# Patient Record
Sex: MALE | Race: WHITE | Employment: FULL TIME | URBAN - NONMETROPOLITAN AREA
[De-identification: names, ages, dates, MRNs, and addresses within clinical notes are randomized per-mention and may not be internally consistent; named-entity substitution may affect disease eponyms.]

---

## 2018-08-15 ENCOUNTER — HOSPITAL ENCOUNTER (INPATIENT)
Age: 23
LOS: 5 days | Discharge: HOME | DRG: 885 | End: 2018-08-21
Attending: EMERGENCY MEDICINE | Admitting: PSYCHIATRY & NEUROLOGY
Payer: COMMERCIAL

## 2018-08-15 DIAGNOSIS — R45.851 DEPRESSION WITH SUICIDAL IDEATION: Primary | ICD-10-CM

## 2018-08-15 DIAGNOSIS — F32.A DEPRESSION WITH SUICIDAL IDEATION: Primary | ICD-10-CM

## 2018-08-15 LAB
ACETAMINOPHEN LEVEL: <15 UG/ML
ALBUMIN SERPL-MCNC: 4.8 G/DL (ref 3.5–5.2)
ALP BLD-CCNC: 62 U/L (ref 40–130)
ALT SERPL-CCNC: 13 U/L (ref 5–41)
AMPHETAMINE SCREEN, URINE: NEGATIVE
ANION GAP SERPL CALCULATED.3IONS-SCNC: 14 MMOL/L (ref 7–19)
AST SERPL-CCNC: 16 U/L (ref 5–40)
BARBITURATE SCREEN URINE: NEGATIVE
BENZODIAZEPINE SCREEN, URINE: NEGATIVE
BILIRUB SERPL-MCNC: 0.7 MG/DL (ref 0.2–1.2)
BUN BLDV-MCNC: 10 MG/DL (ref 6–20)
CALCIUM SERPL-MCNC: 9.7 MG/DL (ref 8.6–10)
CANNABINOID SCREEN URINE: NEGATIVE
CHLORIDE BLD-SCNC: 102 MMOL/L (ref 98–111)
CO2: 23 MMOL/L (ref 22–29)
COCAINE METABOLITE SCREEN URINE: NEGATIVE
CREAT SERPL-MCNC: 0.9 MG/DL (ref 0.5–1.2)
ETHANOL: <10 MG/DL (ref 0–0.08)
GFR NON-AFRICAN AMERICAN: >60
GLUCOSE BLD-MCNC: 101 MG/DL (ref 74–109)
HCT VFR BLD CALC: 43.2 % (ref 42–52)
HEMOGLOBIN: 14.8 G/DL (ref 14–18)
Lab: NORMAL
MCH RBC QN AUTO: 29.7 PG (ref 27–31)
MCHC RBC AUTO-ENTMCNC: 34.3 G/DL (ref 33–37)
MCV RBC AUTO: 86.7 FL (ref 80–94)
OPIATE SCREEN URINE: NEGATIVE
PDW BLD-RTO: 11.9 % (ref 11.5–14.5)
PLATELET # BLD: 213 K/UL (ref 130–400)
PMV BLD AUTO: 11.5 FL (ref 9.4–12.4)
POTASSIUM SERPL-SCNC: 4.1 MMOL/L (ref 3.5–5)
RBC # BLD: 4.98 M/UL (ref 4.7–6.1)
SALICYLATE, SERUM: <3 MG/DL (ref 3–10)
SODIUM BLD-SCNC: 139 MMOL/L (ref 136–145)
TOTAL PROTEIN: 7.8 G/DL (ref 6.6–8.7)
WBC # BLD: 9.8 K/UL (ref 4.8–10.8)

## 2018-08-15 PROCEDURE — G0480 DRUG TEST DEF 1-7 CLASSES: HCPCS

## 2018-08-15 PROCEDURE — 36415 COLL VENOUS BLD VENIPUNCTURE: CPT

## 2018-08-15 PROCEDURE — 85027 COMPLETE CBC AUTOMATED: CPT

## 2018-08-15 PROCEDURE — 99285 EMERGENCY DEPT VISIT HI MDM: CPT

## 2018-08-15 PROCEDURE — 80307 DRUG TEST PRSMV CHEM ANLYZR: CPT

## 2018-08-15 PROCEDURE — 80053 COMPREHEN METABOLIC PANEL: CPT

## 2018-08-15 ASSESSMENT — PATIENT HEALTH QUESTIONNAIRE - PHQ9: SUM OF ALL RESPONSES TO PHQ QUESTIONS 1-9: 9

## 2018-08-16 PROBLEM — R45.851 SUICIDAL IDEATION: Status: ACTIVE | Noted: 2018-08-16

## 2018-08-16 PROCEDURE — 99285 EMERGENCY DEPT VISIT HI MDM: CPT | Performed by: EMERGENCY MEDICINE

## 2018-08-16 PROCEDURE — 99222 1ST HOSP IP/OBS MODERATE 55: CPT | Performed by: PSYCHIATRY & NEUROLOGY

## 2018-08-16 PROCEDURE — 6370000000 HC RX 637 (ALT 250 FOR IP): Performed by: PSYCHIATRY & NEUROLOGY

## 2018-08-16 PROCEDURE — 1240000000 HC EMOTIONAL WELLNESS R&B

## 2018-08-16 PROCEDURE — 6370000000 HC RX 637 (ALT 250 FOR IP)

## 2018-08-16 RX ORDER — ACETAMINOPHEN 325 MG/1
650 TABLET ORAL EVERY 4 HOURS PRN
Status: DISCONTINUED | OUTPATIENT
Start: 2018-08-16 | End: 2018-08-21 | Stop reason: HOSPADM

## 2018-08-16 RX ORDER — FLUOXETINE HYDROCHLORIDE 20 MG/1
20 CAPSULE ORAL DAILY
Status: DISCONTINUED | OUTPATIENT
Start: 2018-08-16 | End: 2018-08-17

## 2018-08-16 RX ORDER — ACETAMINOPHEN 325 MG/1
TABLET ORAL
Status: COMPLETED
Start: 2018-08-16 | End: 2018-08-16

## 2018-08-16 RX ADMIN — ACETAMINOPHEN 650 MG: 325 TABLET ORAL at 14:09

## 2018-08-16 RX ADMIN — FLUOXETINE 20 MG: 20 CAPSULE ORAL at 14:09

## 2018-08-16 ASSESSMENT — SLEEP AND FATIGUE QUESTIONNAIRES
RESTFUL SLEEP: NO
AVERAGE NUMBER OF SLEEP HOURS: 6
DIFFICULTY FALLING ASLEEP: YES
DIFFICULTY STAYING ASLEEP: YES
DIFFICULTY ARISING: NO
DO YOU HAVE DIFFICULTY SLEEPING: YES
SLEEP PATTERN: DIFFICULTY FALLING ASLEEP;RESTLESSNESS

## 2018-08-16 ASSESSMENT — LIFESTYLE VARIABLES: HISTORY_ALCOHOL_USE: NO

## 2018-08-16 ASSESSMENT — PATIENT HEALTH QUESTIONNAIRE - PHQ9: SUM OF ALL RESPONSES TO PHQ QUESTIONS 1-9: 9

## 2018-08-16 ASSESSMENT — PAIN SCALES - GENERAL: PAINLEVEL_OUTOF10: 6

## 2018-08-16 NOTE — PROGRESS NOTES
B[facility] Admission From ED  Nursing Admission Note              There is no problem list on file for this patient. Pt admitted from Dr. Kyle Began care in ED to Adult [facility] room # 608 bed 2. Arrived on unit via [facility] with tech and . Pt appropriately attired in [place]. Body assessment completed by Danitza Boyer with no contraband discovered. All tubes, lines, and drains were appropriately discontinued by ED staff prior to pt transfer to [facility]. Pt belongings and valuables inventoried and cataloged, stored per policy. Pt oriented to surroundings, program expectations, and copy pt rights given. Received admit packet: [facility], Visitation Info, Fall Prevention, Restraints Info. Consents reviewed, signed Pt Rights, Handbook Acceptance, Visit/Call Acceptance, PHI Release, Social Info Release, and Treatment Agreement. Pt verbalizes understanding. Identifies stressors.           Best Noriega RN

## 2018-08-16 NOTE — BH NOTE
Psychiatry Initial Intake    8/15/18    Fernando Angeles ,a 25 y.o. male, presents to the ED for a psychiatric assessment. ED Arrival time: 2200  ED physician: [facility] Notification time: 2313  [facility] AN AFFILIATE OF [facility] Assess time: [phone]  Psychiatrist call time: Brayan Stallings with Dr. Adrianne Quigley    Patient is referred by: self. Patient's brother drove him to the ER. Reason for visit to ED - Presenting problem:     Patient states, \"I haven't felt like myself for about 48 hours ago. I found out something about my wife yesterday that just made my heart drop. It made me feel just terrible. I felt shocked and hurt. I threw the tablet with the text on it down the salguero and it broke. Then I went to my bedside table where I keep my pistol. I just felt like that I wanted to die. I was getting ready to cock it and put it to my head when my wife stopped me. After that, I heard my 2 week old daughter. My wife has post partum pretty bad but it has really been affecting all of us. She doesn't want anything to do with me. When I first saw the message, my emotions were hurt and at the same time I was pissed and I really wanted to hurt the person that sent the message. \"  Patient denies AVH at this time;. Patient's niece, Lusi Marcelo, is at bedside with patient's permission. ER Physician reports:  Fernando Angeles is a 25 y.o. male who presents to the emergency department Due to depression and suicidal ideation. Patient said that his wife recently had a baby. He said that she's been dealing with postpartum depression and this is causing increased stress for him. He said he has always had some anxiety but has never seen anyone about this. He will not provide all the details but from what I gather it sounds like he had some suspicion that his wife was cheating on him. He said when he found this out today (after reading some of her text messages) he became very upset this morning.  He said that he went to get his pistol with plan to shoot himself but his wife stopped him. I questioned patient about HI. He denies this but said when he was very upset this morning he may have had HI. Very vague about this. He will not give me more details as far as what he means. Denies any HI at this time. No hallucinations. No delusions. No other medical problems.       Duration of symptoms: Worsening over the last 3 weeks. Current Stressors: family    SI:  admits to   Plan: yes   If yes describe: see above  Past SI attempts: no   If yes describe:    How many: 0  Dates or Ages:   Currently able to contract for safety outside hospital: no   Describe: patient is SI    C-SSRS Completed: yes    HI: admits to  If yes describe:  Wanted to hurt the person that sent the message  Delusions: denies  If yes describe:   Hallucinations: denies   If yes describe:   Risk of Harm to self: yes   If yes explain: see above  Was it within the past 6 months: yes   Risk of Harm to others: yes   If yes explain: see above  Was it within the past 6 months: yes   Anxiety 1-10:  10  Explain if increased:   Depression 1-10:  4  Explain if increased:   Risk taking behaviors: no  If yes explain:  Level of function outside hospital decreased: no   If yes explain:       History of Psychiatric Treatment:     Previous Outpatient therapy: No  If yes where & when:   Are you compliant with appointments: Yes  Psychiatric Hospitalizations: No   Where & When:   Previous Diagnosis:  no  Previous psychiatric medications: No   If yes list examples:   Are you compliant with medications: Yes     Violence and Trauma History:     History of violence by patient: no   If yes explain:   History of Trauma: no    If yes explain:   History of Abuse: no   If yes explain/by whom:     Family History:    Family history of mental illness: yes   Family member & Diagnosis:  yes, mother, brother, niece, and nephew with Depression, Anxiety and Bipolar  Family members with suicide attempt: no   If yes explain:   Family

## 2018-08-16 NOTE — PLAN OF CARE
Problem: Depressive Behavior With or Without Suicide Precautions:  Goal: Able to verbalize acceptance of life and situations over which he or she has no control  Able to verbalize acceptance of life and situations over which he or she has no control  Outcome: Ongoing                                                                      Group Therapy Note    Date: 8/16/2018  Start Time: 1015  End Time:  1100  Number of Participants: 13    Type of Group: Psychoeducation    Wellness Binder Information  Module Name:  Emotional wellness  Session Number:  2    Patient's Goal:  Anger management    Notes:  Pt attended group as scheduled. Pt participated by identifying situations in life that causes anger. Pt participated in group discussion exploring anger management techniques such as taking a time out, exercising, and deep breathing. Status After Intervention:  Improved    Participation Level:  Active Listener    Participation Quality: Attentive      Speech:  normal      Thought Process/Content: Logical      Affective Functioning: Congruent      Mood: depressed      Level of consciousness:  Attentive      Response to Learning: Able to verbalize current knowledge/experience      Endings: None Reported    Modes of Intervention: Education and Support      Discipline Responsible: /Counselor      Signature:  Cheryl Armando

## 2018-08-16 NOTE — PLAN OF CARE
Problem: Depressive Behavior With or Without Suicide Precautions:  Goal: Able to verbalize acceptance of life and situations over which he or she has no control  Able to verbalize acceptance of life and situations over which he or she has no control   Outcome: Ongoing                                                                      Group Therapy Note    Date: 8/16/2018  Start Time: 1430  End Time:  1530  Number of Participants: 8    Type of Group: Recovery    Wellness Binder Information  Module Name:  Stress  Session Number:  5    Patient's Goal:  To raise awareness of the effectiveness of diversionary coping skills    Notes:  Pt demonstrated improved awareness of the effectiveness of diversionary coping skills by actively participating in group activity.     Status After Intervention:  Unchanged    Participation Level: Interactive    Participation Quality: Attentive      Speech:  normal      Thought Process/Content: Logical      Affective Functioning: Congruent      Mood: anxious and depressed      Level of consciousness:  Alert and Oriented x4      Response to Learning: Able to verbalize current knowledge/experience, Able to verbalize/acknowledge new learning and Progressing to goal      Endings: None Reported    Modes of Intervention: Education      Discipline Responsible: Psychoeducational Specialist      Signature:  Santos Ramos

## 2018-08-16 NOTE — PROGRESS NOTES
BHI Daily Shift Assessment  Nursing Progress Note    Room: 06/611-01 Name: Kristal Stovall Age: 25 y.o. Ethnicity:  Gender: male   Dx: <principal problem not specified>  Precautions: suicide risk  CPAP: No Accu-Chek: No  Sleep: Yes, Good, no sleep issues Hours Slept: 8 Sched Sleep Meds: No PRN Sleep Meds: No Other PRN Meds: Yes Med Compliant: Yes Appetite: good Percent Meals: 100% Social: No ADLs: No Speech: normal Depression: 3 Anxiety: 8  Patient is calm, pleasant, and cooperative. Thought processes are linear, logical, and goal oriented. Appearance is appropriate. Affect is flat and congruent. Mood is sad. Poor eye contact made. Not sociable. Patient is evasive and withdrawn. Attending groups.     Rosie Bernla RN

## 2018-08-16 NOTE — PLAN OF CARE
Problem: Depressive Behavior With or Without Suicide Precautions:  Goal: Able to verbalize acceptance of life and situations over which he or she has no control  Able to verbalize acceptance of life and situations over which he or she has no control   Outcome: Ongoing                                                                      Group Therapy Note    Date: 8/16/2018  Start Time: 1100  End Time:  1200  Number of Participants: 9    Type of Group: Cognitive Skills    Wellness Binder Information  Module Name:  Mental Health Wellness  Session Number:  1    Patient's Goal:  To improve knowledge of practical facts about depression    Notes:  Pt demonstrated improved knowledge of practical facts about depression by actively participating in group discussion.     Status After Intervention:  Unchanged    Participation Level: Interactive    Participation Quality: Attentive      Speech:  normal      Thought Process/Content: Logical      Affective Functioning: Congruent      Mood: anxious and depressed      Level of consciousness:  Alert and Oriented x4      Response to Learning: Able to verbalize current knowledge/experience, Able to verbalize/acknowledge new learning and Progressing to goal      Endings: None Reported    Modes of Intervention: Education      Discipline Responsible: Psychoeducational Specialist      Signature:  Mary Jackson

## 2018-08-16 NOTE — PLAN OF CARE
Problem: Health Behavior:  Goal: Ability to verbalize adaptive coping strategies to use when suicidal feelings occur will improve  Ability to verbalize adaptive coping strategies to use when suicidal feelings occur will improve   Outcome: Ongoing    Goal: Ability to verbalize adaptive coping strategies to use when the urge to self-mutilate occurs will improve  Ability to verbalize adaptive coping strategies to use when the urge to self-mutilate occurs will improve   Outcome: Ongoing

## 2018-08-16 NOTE — BH NOTE
[facility] CHRISTOPHER [address], [address] [facility]                              PSYCHIATRIC EVALUATION    PATIENT NAME: Jadyn Zaragoza                    :        1995  MED REC NO:   [id number]                              ROOM:       [facility]  ACCOUNT NO:   [de-identified]                           ADMIT DATE: 08/15/2018  PROVIDER:     Roosevelt Lucas MD    IDENTIFYING INFORMATION:  The patient is a 80-year-old    male who lives with his wife, his 1week-old daughter, in [place], [place]. The patient has a job at [employer]. The patient was admitted  from ER to inpatient psych unit for depression, suicidal ideation on  08/15/2018 voluntarily. This MD interviewed, examined the patient in  person face-to-face for psychiatric evaluation on 2018. CHIEF COMPLAINT:  \"I need help for my depression, anxiety, and suicidal  ideation. \"    HISTORY OF PRESENT ILLNESS:  The patient is a 80-year-old    male, who denies significant past medical history, but significant past  psychiatric family history of depression, ADHD and past psychiatric history  of seasonal affective disorder, was brought to the emergency room by his  brother, his sister-in-law, his niece for his depression, suicidal  ideation. Per the patient during the interview today and he has a long  history of depression at least more than 6 years ago. When the winter time, he  feels more depressed. He had never asked for any help. His depression got  worse in the past several weeks after his wife gave birth to a daughter. Per the patient, his wife has postpartum depression and has been giving him  a hard time and yesterday he found out that his wife was texting to other  man, he got so mad, so sad, he went to his bedroom and got the pistol and  tried to commit suicide, but his wife stopped him.   Per the patient in the  past several weeks, he feels sad most of the time with alert and oriented to  time, place and person. His short-term memory seems to be mildly impaired,  long-term memory seems to be fair. Language, articulated. Speech, slow  rate and rhythm and volume, but articulated, coherent. Attention span is  very short. Concentration is poor. Mood, the patient reports as sad and  anxious. Affect is mood congruent. Thought process is organized and  logical.  Association is intact. Thought content, the patient admits to  suicidal ideation with a plan by gun shooting, but denied any homicidal  ideation. Denied any auditory or visual hallucinations or any paranoia. The patient seemed to have average IQ and education imparted knowledge. The patient had limited insight regarding his current psychiatric condition  and poor judgment. The patient had evidence of suicidal ideation with a  plan. ASSESSMENT:  Major depressive disorder, single episode, moderate to severe  without psychotic features with seasonal pattern, persistent depressive disorder. PLAN:  The patient is an imminent danger to himself, therefore inpatient  level of care is needed. Keep the patient under 15-minute safe check for  safety, psychiatric education regarding major depressive disorder,  persistent depressive disorder, and treatment options. The patient will  also be provided with individual therapy, group therapy, substance abuse  counseling, gains-added coping skills. Dr. Hans Marcial will examine the patient  for any acute medical issues. Start the patient with Prozac 20 mg daily  for his depression and anxiety and titrate the dosage slowly. The patient  has been strongly encouraged to go to group and coping skill for stress,  anxiety. The patient will be assessed on a daily basis for his depression,  anxiety, and suicidal ideation on a daily basis and will be discharged back  to his outpatient mental health provider upon stabilization. This is a  voluntary admission.   Expected length of

## 2018-08-17 LAB
TSH SERPL DL<=0.05 MIU/L-ACNC: 1.82 UIU/ML (ref 0.27–4.2)
VITAMIN B-12: 283 PG/ML (ref 211–946)
VITAMIN D 25-HYDROXY: 16.6 NG/ML

## 2018-08-17 PROCEDURE — 1240000000 HC EMOTIONAL WELLNESS R&B

## 2018-08-17 PROCEDURE — 99232 SBSQ HOSP IP/OBS MODERATE 35: CPT | Performed by: PSYCHIATRY & NEUROLOGY

## 2018-08-17 PROCEDURE — 36415 COLL VENOUS BLD VENIPUNCTURE: CPT

## 2018-08-17 PROCEDURE — 82306 VITAMIN D 25 HYDROXY: CPT

## 2018-08-17 PROCEDURE — 84443 ASSAY THYROID STIM HORMONE: CPT

## 2018-08-17 PROCEDURE — 82607 VITAMIN B-12: CPT

## 2018-08-17 PROCEDURE — 6370000000 HC RX 637 (ALT 250 FOR IP): Performed by: PSYCHIATRY & NEUROLOGY

## 2018-08-17 RX ORDER — TRAZODONE HYDROCHLORIDE 50 MG/1
50 TABLET ORAL NIGHTLY
Status: DISCONTINUED | OUTPATIENT
Start: 2018-08-17 | End: 2018-08-21 | Stop reason: HOSPADM

## 2018-08-17 RX ORDER — CHOLECALCIFEROL (VITAMIN D3) 125 MCG
500 CAPSULE ORAL DAILY
Status: DISCONTINUED | OUTPATIENT
Start: 2018-08-18 | End: 2018-08-21 | Stop reason: HOSPADM

## 2018-08-17 RX ORDER — ERGOCALCIFEROL (VITAMIN D2) 1250 MCG
50000 CAPSULE ORAL WEEKLY
Qty: 11 CAPSULE | Refills: 0 | Status: SHIPPED | OUTPATIENT
Start: 2018-08-18 | End: 2021-07-28

## 2018-08-17 RX ORDER — ERGOCALCIFEROL 1.25 MG/1
50000 CAPSULE ORAL WEEKLY
Status: DISCONTINUED | OUTPATIENT
Start: 2018-08-18 | End: 2018-08-21 | Stop reason: HOSPADM

## 2018-08-17 RX ADMIN — FLUOXETINE 20 MG: 20 CAPSULE ORAL at 08:18

## 2018-08-17 RX ADMIN — TRAZODONE HYDROCHLORIDE 50 MG: 50 TABLET ORAL at 22:06

## 2018-08-17 NOTE — PROGRESS NOTES
Group Therapy Note    Date: 8/16/2018  Start Time: 2015  End Time:  2045  Number of Participants: 12    Type of Group: Wrap-Up    Wellness Binder Information  Module Name:    Session Number:      Patient's Goal:      Notes:  Filled out wrap up sheet    Status After Intervention:      Participation Level:     Participation Quality:       Speech:         Thought Process/Content:       Affective Functioning:       Mood:       Level of consciousness:        Response to Learning:       Endings:     Modes of Intervention:       Discipline Responsible: [address] [place] [address]      Signature:  Linda Smith

## 2018-08-17 NOTE — H&P
HISTORY and PHYSICAL      CHIEF COMPLAINT:  Depression, SI    Reason for Admission:  Depression, SI    History Obtained From:  patient    HISTORY OF PRESENT ILLNESS:      The patient is a 25 y.o. male who is admitted to the [place] [address] unit with worsening mood issues. He has no c/o CP or SOA. No abdominal pain or N/V. No dyusria. He has c/o HA. This is a typical HA for him. No recent illnesses or fevers. No pain complaints. Past Medical History:    History reviewed. No pertinent past medical history. Past Surgical History:    History reviewed. No pertinent surgical history. Medications Prior to Admission:    No prescriptions prior to admission. Allergies:  Azithromycin    Social History:   TOBACCO:   reports that he has never smoked. His smokeless tobacco use includes Snuff. ETOH:   reports that he does not drink alcohol. DRUGS:   reports that he does not use drugs. MARITAL STATUS:   OCCUPATION:  Working and in school  Patient currently lives with family       Family History:   History reviewed. No pertinent family history. REVIEW OF SYSTEMS:  Constitutional: neg  CV: neg  Pulmonary: neg  GI: neg  : neg  Psych: depression, SI  Neuro: HA  Skin: neg  MusculoSkeletal: neg  HEENT: neg  Joints: neg    Vitals:  /63   Pulse 77   Temp 96.5 °F (35.8 °C) (Temporal)   Resp 18   Wt 175 lb 6.4 oz (79.6 kg)   SpO2 97%     PHYSICAL EXAM:  Gen: NAD, alert  HEENT: WNL  Lymph: no LAD  Neck: no JVD or masses  Chest: CTA bilat  CV: RRR  Abdomen: NT/ND  Extrem: no C/C/E  Neuro: non focal  Skin: no rashes  Joints: no redness    DATA:  I have reviewed the admission labs and imaging tests.     ASSESSMENT AND PLAN:      Patient Active Hospital Problem List:   Depression, Suicidal ideation----follow with Saad Viera MD  10:49 PM 8/16/2018

## 2018-08-17 NOTE — PLAN OF CARE
Problem: Altered Mood, Depressive Behavior:  Intervention: Assess coping skills and behavior                                                                      Group Therapy Note    Date: 8/17/2018  Start Time: 1000  End Time:  7105  Number of Participants: 12    Type of Group: Psychotherapy    Wellness Binder Information  Module Name:  Staying well  Session Number:  1    Patient's Goal:  Daily maintenance and coping skills    Notes:  Pt acknowledged use of positive coping skills daily to help stay well.     Status After Intervention:  Improved    Participation Level: Interactive    Participation Quality: Appropriate, Attentive and Sharing      Speech:  normal      Thought Process/Content: Logical      Affective Functioning: Congruent      Mood: congruent      Level of consciousness:  Alert, Oriented x4 and Attentive      Response to Learning: Able to verbalize current knowledge/experience      Endings: None Reported    Modes of Intervention: Education      Discipline Responsible: Psychoeducational Specialist      Signature:  Jessica Vogt

## 2018-08-17 NOTE — PROGRESS NOTES
Patient is calm and cooperative with care. Patient is social and attends groups. Patient is compliant with medications. Patient completed ADLs. Patient's appetite is good and he reported he slept well. Patient rates D: 2-3, A:5, and denies SI, HI, and AVH.

## 2018-08-17 NOTE — PROGRESS NOTES
impaired,  long-term memory seems to be fair. Language, articulated. Speech, slow  rate and rhythm and volume, but articulated, coherent. Attention span is  very short. Concentration is poor. Mood, the patient reports as sad and  anxious. Affect is mood congruent. Thought process is organized and  logical.  Association is intact. Thought content, the patient denies any  suicidal ideation and he also denied any homicidal  ideation. Denied any auditory or visual hallucinations or any paranoia. The patient seemed to have average IQ and education imparted knowledge. The patient had limited insight regarding his current psychiatric condition  and poor judgment. The patient had evidence of suicidal ideation with a  plan.  FLUoxetine  20 mg Oral Daily       Current Medications:  Current Facility-Administered Medications   Medication Dose Route Frequency Provider Last Rate Last Dose    FLUoxetine (PROZAC) capsule 20 mg  20 mg Oral Daily Leonel Tripathi MD   20 mg at 08/17/18 0818    acetaminophen (TYLENOL) tablet 650 mg  650 mg Oral Q4H PRN Leonel Tripathi MD   650 mg at 08/16/18 1409       Psychotherapy:   SUPPORTIVE    DSM V Diagnoses:       Major depressive disorder, single episode, moderate to severe  without psychotic features with seasonal pattern, persistent depressive disorder.     PLAN:  The patient was an imminent danger to himself, therefore inpatient  level of care is needed. Keep the patient under 15-minute safe check for  safety, psychiatric education regarding major depressive disorder,  persistent depressive disorder, and treatment options. The patient will  also be provided with individual therapy, group therapy, substance abuse  counseling, gains-added coping skills. Dr. Jona Orozco will examine the patient  for any acute medical issues. Increase the patient with Prozac 30 mg daily  for his depression and anxiety and titrate the dosage slowly. Start him with   Trazodone 50 mg QHS PRN for insomnia.  The patient  has been strongly encouraged to go to group and coping skill for stress,  anxiety. The patient will be assessed on a daily basis for his depression,  anxiety, and suicidal ideation on a daily basis and will be discharged back  to his outpatient mental health provider upon stabilization. This is a  voluntary admission. Expected length of stay is 3-5 days.       ACTIVE MEDICAL PROBLEMS:  Patient Active Problem List   Diagnosis    Suicidal ideation    Depression with suicidal ideation       Amount of time spent with patient:  15 minutes with greater than 50% of the time spent in counseling and collaboration of care.

## 2018-08-18 PROCEDURE — 6370000000 HC RX 637 (ALT 250 FOR IP): Performed by: PSYCHIATRY & NEUROLOGY

## 2018-08-18 PROCEDURE — 1240000000 HC EMOTIONAL WELLNESS R&B

## 2018-08-18 PROCEDURE — 99232 SBSQ HOSP IP/OBS MODERATE 35: CPT | Performed by: PSYCHIATRY & NEUROLOGY

## 2018-08-18 PROCEDURE — 6370000000 HC RX 637 (ALT 250 FOR IP): Performed by: FAMILY MEDICINE

## 2018-08-18 RX ADMIN — ERGOCALCIFEROL 50000 UNITS: 1.25 CAPSULE ORAL at 09:45

## 2018-08-18 RX ADMIN — FLUOXETINE 30 MG: 20 CAPSULE ORAL at 09:45

## 2018-08-18 RX ADMIN — CYANOCOBALAMIN TAB 500 MCG 500 MCG: 500 TAB at 09:45

## 2018-08-18 NOTE — PROGRESS NOTES
Group Therapy Note    Date: 8/17/2018  Start Time: 1915  End Time:  2015  Number of Participants: 16    Type of Group: Wrap-Up    Patient's Goal:  Pt was in attendance. Notes:        Participation Level:  Active Listener    Participation Quality: Attentive      Speech:  normal      Thought Process/Content: Logical      Affective Functioning: Congruent      Mood: calm      Level of consciousness:  Alert, Oriented x4 and Attentive      Discipline Responsible: Behavorial Gogiro      Signature: Selvin Washington

## 2018-08-18 NOTE — PROGRESS NOTES
He said that his wife stopped him. He said that his wife have post partum depression and felt that she was going to leave him. Pt said that he is happy to be in the unit, he said that he did not want to loose his family. PLAN:    1. Continue precautions, protocols as ordered. 2. Initiate, continue, adjust medications as ordered. 3. Individual / group therapies, including coping skill development and hope building strategies  4. Ongoing discharge and aftercare planning. 5. Continue with current treatment at this time. 6.   Pt was educated about cessation of drugs, etoh, smoking. Continue with current treatment  Pt is interested in couple therapy.

## 2018-08-18 NOTE — PROGRESS NOTES
NURSING PROGRESS NOTE:     DATA: Patient interview      ACTION: Continuous 15 minute monitoring of patient; interview and observation of patient; medications given as ordered. Suicide Precautions in place.     RESPONSE: patient is alert,oriented, calm and cooperative. patient is in day room on the phone with his wife. patient did come to the desk and show staff photos that his wife had brought to him at his visit today. Patient did state that his anxiety has been up and down today and he doesn't know why it has  Been like that. Patient did attend group and is med compliant. No obvious s/s of distress voiced or noted.  Will continue to monitor.          Depression: 2  Anxiety:6  [address] [facility]

## 2018-08-18 NOTE — PROGRESS NOTES
Progress Note  Booker Hayward  8/17/2018 10:28 PM  Subjective:   Admit Date:   8/15/2018      CC/ADMIT DX:       Interval History:   Reviewed overnight events and nursing notes. No new physical complaints. I have reviewed all labs/diagnostics from the last 24hrs. ROS:   I have done a 10 point ROS and all are negative, except what is mentioned in the HPI. DIET GENERAL;    Medications:      [START ON 8/18/2018] FLUoxetine  30 mg Oral Daily    traZODone  50 mg Oral Nightly    [START ON 8/18/2018] vitamin D  50,000 Units Oral Weekly    [START ON 8/18/2018] vitamin B-12  500 mcg Oral Daily           Objective:   Vitals: /85   Pulse 61   Temp 96.7 °F (35.9 °C) (Temporal)   Resp 16   Wt 175 lb 6.4 oz (79.6 kg)   SpO2 97%  No intake or output data in the 24 hours ending 08/17/18 2228  General appearance: alert and cooperative with exam  Lungs: clear to auscultation bilaterally  Heart: regular rate and rhythm, S1, S2 normal, no murmur, click, rub or gallop  Abdomen: soft, non-tender; bowel sounds normal; no masses,  no organomegaly  Extremities: extremities normal, atraumatic, no cyanosis or edema  Neurologic:  No obvious focal neurologic deficits. Assessment and Plan: Active Problems:    Suicidal ideation    Depression with suicidal ideation  Resolved Problems:    * No resolved hospital problems. *    Vit D Def    Plan:  1. Continue present medication(s)   2. Replace Vit D and B12  3. Follow with Psych      Discharge planning:   home     Reviewed treatment plans with the patient and/or family.              Electronically signed by Naseem Villa MD on 8/17/2018 at 10:28 PM

## 2018-08-18 NOTE — PLAN OF CARE
Problem: Depressive Behavior With or Without Suicide Precautions:  Goal: Able to verbalize and/or display a decrease in depressive symptoms  Able to verbalize and/or display a decrease in depressive symptoms   Outcome: Ongoing                                                                      Group Therapy Note    Date: 8/18/2018  Start Time: 1430  End Time:  1500  Number of Participants: 14    Type of Group: Recovery    Wellness Binder Information  Module Name:  Social Wellness  Session Number:  4. Patient's Goal:  Healthy vs. Unhealthy Relationships    Notes: Pt acknowledged that putting someone down and being very judgemental are signs of an unhealthy relationship, and that if there is someone in your life that is unhealthy, you probably need to set up some boundaries in that relationship and focus on putting your well-being above all else. Status After Intervention:  Improved    Participation Level:  Active Listener and Interactive    Participation Quality: Appropriate and Attentive      Speech:  normal      Thought Process/Content: Logical      Affective Functioning: Congruent      Mood: depressed      Level of consciousness:  Alert, Oriented x4 and Attentive      Response to Learning: Able to verbalize current knowledge/experience and Progressing to goal      Endings: None Reported    Modes of Intervention: Support and Clarifying      Discipline Responsible: Psychoeducational Specialist      Signature:  Sarahy Bauer

## 2018-08-18 NOTE — PROGRESS NOTES
Patient is calm and cooperative with care. Patient is minimally social, but does attend groups. Patient is compliant with medications. Patient completed ADLs. Patient's appetite is good and he reported he slept well. Patient rates D:1-2, A:4, and denies SI, HI, and AVH.

## 2018-08-18 NOTE — PLAN OF CARE
Problem: Depressive Behavior With or Without Suicide Precautions:  Goal: Able to verbalize acceptance of life and situations over which he or she has no control  Able to verbalize acceptance of life and situations over which he or she has no control   Outcome: Ongoing                                                                      Group Therapy Note    Date: 8/18/2018  Start Time: 1400  End Time:  1430  Number of Participants: 14    Type of Group: Cognitive Skills    Wellness Binder Information  Module Name:  Staying Well  Session Number:  1. Patient's Goal:  Daily Maintenance and Coping Skills    Notes: Pt created a change-plan worksheet and shared with the group some changes they need to make to improve their mental well-being, important reasons for those changes, steps they plan to take in order to implement these changes, and how their support group can help them through it. Pt learned some important habits they need to form that can change their lives, which included developing positive thinking, displaying kindness daily, developing a daily routine, and focusing on only one goal at a time. Status After Intervention:  Improved    Participation Level:  Active Listener and Interactive    Participation Quality: Appropriate and Attentive      Speech:  normal      Thought Process/Content: Logical      Affective Functioning: Congruent      Mood: depressed      Level of consciousness:  Alert, Oriented x4 and Attentive      Response to Learning: Able to verbalize/acknowledge new learning and Progressing to goal      Endings: None Reported    Modes of Intervention: Support and Clarifying      Discipline Responsible: Psychoeducational Specialist      Signature:  Asher Lim

## 2018-08-19 PROCEDURE — 6370000000 HC RX 637 (ALT 250 FOR IP): Performed by: PSYCHIATRY & NEUROLOGY

## 2018-08-19 PROCEDURE — 1240000000 HC EMOTIONAL WELLNESS R&B

## 2018-08-19 PROCEDURE — 6370000000 HC RX 637 (ALT 250 FOR IP): Performed by: FAMILY MEDICINE

## 2018-08-19 RX ADMIN — FLUOXETINE 30 MG: 20 CAPSULE ORAL at 08:02

## 2018-08-19 RX ADMIN — TRAZODONE HYDROCHLORIDE 50 MG: 50 TABLET ORAL at 21:29

## 2018-08-19 RX ADMIN — CYANOCOBALAMIN TAB 500 MCG 500 MCG: 500 TAB at 08:02

## 2018-08-19 NOTE — PLAN OF CARE
Problem: Depressive Behavior With or Without Suicide Precautions:  Goal: Able to verbalize and/or display a decrease in depressive symptoms  Able to verbalize and/or display a decrease in depressive symptoms   Outcome: Ongoing                                                                      Group Therapy Note    Date: 8/19/2018  Start Time: 1400  End Time:  5934  Number of Participants: 13    Type of Group: Recovery    Wellness Binder Information  Module Name:  Emotional Wellness  Session Number:  3. Patient's Goal:  Happiness    Notes: Pt discussed positive thinking and shared accomplishments they've made, lessons learned, challenges overcome, and things they are grateful for. Status After Intervention:  Improved    Participation Level:  Active Listener    Participation Quality: Appropriate and Attentive      Speech:  normal      Thought Process/Content: Logical      Affective Functioning: Congruent      Mood: depressed      Level of consciousness:  Alert and Oriented x4      Response to Learning: Progressing to goal      Endings: None Reported    Modes of Intervention: Support      Discipline Responsible: Psychoeducational Specialist      Signature:  Tanja Cleaning

## 2018-08-19 NOTE — PROGRESS NOTES
Group Therapy Note    Start Time: 918   End Time:  882  Number of Participants: 16    Type of Group: Community Meeting       Patient's Goal:  \"journaling, Hoping I go home to my family soon\"      Notes:      Participation Level:  Active Listener       Participation Quality: Appropriate      Thought Process/Content: Logical      Affective Functioning: Congruent      Mood: calm      Level of consciousness:  Alert      Modes of Intervention: Support      Discipline Responsible: Behavioral Health Tech II      Signature:  Ca Troncoso

## 2018-08-20 PROCEDURE — 1240000000 HC EMOTIONAL WELLNESS R&B

## 2018-08-20 PROCEDURE — 6370000000 HC RX 637 (ALT 250 FOR IP): Performed by: FAMILY MEDICINE

## 2018-08-20 PROCEDURE — 6370000000 HC RX 637 (ALT 250 FOR IP): Performed by: PSYCHIATRY & NEUROLOGY

## 2018-08-20 PROCEDURE — 99232 SBSQ HOSP IP/OBS MODERATE 35: CPT | Performed by: PSYCHIATRY & NEUROLOGY

## 2018-08-20 RX ADMIN — CYANOCOBALAMIN TAB 500 MCG 500 MCG: 500 TAB at 09:07

## 2018-08-20 RX ADMIN — TRAZODONE HYDROCHLORIDE 50 MG: 50 TABLET ORAL at 21:23

## 2018-08-20 RX ADMIN — FLUOXETINE 30 MG: 20 CAPSULE ORAL at 09:07

## 2018-08-20 NOTE — PLAN OF CARE
Problem: Health Behavior:  Goal: Ability to verbalize adaptive coping strategies to use when suicidal feelings occur will improve  Ability to verbalize adaptive coping strategies to use when suicidal feelings occur will improve   Outcome: Ongoing    Goal: Ability to verbalize adaptive coping strategies to use when the urge to self-mutilate occurs will improve  Ability to verbalize adaptive coping strategies to use when the urge to self-mutilate occurs will improve   Outcome: Ongoing      Problem: Safety:  Goal: Ability to contract for his/her safety will improve  Ability to contract for his/her safety will improve   Outcome: Ongoing      Problem: Depressive Behavior With or Without Suicide Precautions:  Goal: Able to verbalize acceptance of life and situations over which he or she has no control  Able to verbalize acceptance of life and situations over which he or she has no control   Outcome: Ongoing    Goal: Able to verbalize and/or display a decrease in depressive symptoms  Able to verbalize and/or display a decrease in depressive symptoms   Outcome: Ongoing    Goal: Ability to disclose and discuss suicidal ideas will improve  Ability to disclose and discuss suicidal ideas will improve   Outcome: Ongoing    Goal: Able to verbalize support systems  Able to verbalize support systems   Outcome: Ongoing    Goal: Absence of self-harm  Absence of self-harm   Outcome: Ongoing    Goal: Participates in care planning  Participates in care planning   Outcome: Ongoing

## 2018-08-20 NOTE — PROGRESS NOTES
ADMIT DATE: 8/15/2018  Day 4     PROGRESS NOTE    SUBJECTIVE / INTERVAL HX    CHIEF COMPLAINT: \"My wife acts like she hates me. \"    Lisa Han  Is accepted in transfer. He indicates that he came into the hospital feeling suicidal. He caught his wife texting another man that she loved him, so he threw her tablet and destroyed it, then held a gun to his head. He accepts responsibility for ignoring his wife the entire time they have been together. He said he even played XBox while sh was in labor. (He plays XBox every day after work and ignores her.) He regrets this and wants to work out things with her. She is 3 weeks postpartum and according to him suffers from postpartum depression. This is manifested primarily by not wanting to be around him or other people, and she is staying with her father currently. Today he is requesting discharge, although he remains quite vulnerable and passively suicidal. He dose not want to address the fact that his wife may not want to be with him any longer. He says when he is discharged his brother is going to stay with him for his safety. I have suggested a family meeting as patient indicates his wife is picking hi up, but he is adamant that he wants to talk to her before it is scheduled. He is having no particular side effects from medication. He is eating and sleeping adequately. OBJECTIVE    Vitals:    08/20/18 0748   BP: 122/69   Pulse: 81   Resp: 18   Temp: 97.1 °F (36.2 °C)   SpO2: 94%       MENTAL STATUS EXAM:    Neatly groomed, polite and well engaged. Sad appearing. No evidence of a thought disorder. Cognitive exam grossly intact. Mood \"depressed\" with flat affect. Denies active SI but has passive SI. LABS:  No results found for this or any previous visit (from the past 24 hour(s)).       CURRENT HOSPITAL MEDICATIONS    Current Facility-Administered Medications   Medication Dose Route Frequency Provider Last Rate Last Dose    FLUoxetine (PROZAC) capsule 30 mg  30 mg Oral Daily Billy Trevino MD   30 mg at 08/20/18 8683    traZODone (DESYREL) tablet 50 mg  50 mg Oral Nightly Billy Trevino MD   50 mg at 08/19/18 2129    vitamin D (ERGOCALCIFEROL) capsule 50,000 Units  50,000 Units Oral Weekly Jarret Portillo MD   50,000 Units at 08/18/18 0945    vitamin B-12 (CYANOCOBALAMIN) tablet 500 mcg  500 mcg Oral Daily Jarret Portillo MD   500 mcg at 08/20/18 6596    acetaminophen (TYLENOL) tablet 650 mg  650 mg Oral Q4H PRN Billy Trevino MD   650 mg at 08/16/18 1409         DIAGNOSES    Major depressive disorder, single episode, moderate to severe     ASSESSMENT / TREATMENT PLAN      1. Continue precautions, protocols as ordered. 2. Initiate, continue, adjust medications as ordered. 3. Individual / group therapies, including coping skill development and hope building strategies  4. Ongoing discharge and aftercare planning. 5. Continue with current treatment at this time. We are anticipating discharge for tomorrow after family meeting if patient is no longer suicidal and has an adequate safety plan. TIME SPENT:  30 minutes with greater than 50% devoted to direct patient care and case management.

## 2018-08-20 NOTE — PROGRESS NOTES
Group Therapy Note    Start Time: 0900  End Time:  0930  Number of Participants: 15    Type of Group: Community Meeting       Patient's Goal:  \"Getting Discharged\"       Notes:      Participation Level:  Active Listener       Participation Quality: Appropriate      Thought Process/Content: Logical      Affective Functioning: Congruent      Mood: Calm      Level of consciousness:  Alert      Modes of Intervention: Support      Discipline Responsible: Behavioral Health Tech II      Signature:  Talon Merida

## 2018-08-21 VITALS
RESPIRATION RATE: 16 BRPM | OXYGEN SATURATION: 100 % | WEIGHT: 175.4 LBS | DIASTOLIC BLOOD PRESSURE: 55 MMHG | HEART RATE: 69 BPM | SYSTOLIC BLOOD PRESSURE: 109 MMHG | TEMPERATURE: 97.1 F

## 2018-08-21 PROBLEM — F43.29 ADJUSTMENT DISORDER WITH MIXED EMOTIONAL FEATURES: Status: ACTIVE | Noted: 2018-08-21

## 2018-08-21 PROCEDURE — 6370000000 HC RX 637 (ALT 250 FOR IP): Performed by: PSYCHIATRY & NEUROLOGY

## 2018-08-21 PROCEDURE — 5130000000 HC BRIDGE APPOINTMENT

## 2018-08-21 PROCEDURE — 99239 HOSP IP/OBS DSCHRG MGMT >30: CPT | Performed by: PSYCHIATRY & NEUROLOGY

## 2018-08-21 PROCEDURE — 6370000000 HC RX 637 (ALT 250 FOR IP): Performed by: FAMILY MEDICINE

## 2018-08-21 RX ORDER — FLUOXETINE 10 MG/1
30 CAPSULE ORAL DAILY
Qty: 30 CAPSULE | Refills: 0 | Status: SHIPPED | OUTPATIENT
Start: 2018-08-22 | End: 2020-03-03

## 2018-08-21 RX ORDER — TRAZODONE HYDROCHLORIDE 50 MG/1
50 TABLET ORAL NIGHTLY
Qty: 30 TABLET | Refills: 0 | Status: SHIPPED | OUTPATIENT
Start: 2018-08-21 | End: 2020-03-03

## 2018-08-21 RX ADMIN — CYANOCOBALAMIN TAB 500 MCG 500 MCG: 500 TAB at 08:47

## 2018-08-21 RX ADMIN — FLUOXETINE 30 MG: 20 CAPSULE ORAL at 08:47

## 2018-08-21 NOTE — PROGRESS NOTES
Discharge Note     Pt discharging on this date. Pt denies SI, HI, and AVH at this time. Pt reports improvement in behavior and is leaving unit in overall good condition. SW and pt discussed pt's follow up appointments and importance of attending appointments as scheduled, pt voiced understanding and agreement. Pt and SW also discussed pt safety plan and pt able to verbally identify: warning signs, coping strategies, places and people that help make the pt feel better/distract negative thoughts, friends/family/agencies/professionals the pt can reach out to in a crisis, and something that is important to the pt/worth living for. Pt provided the national suicide prevention hotline number (8-913-298-232-624-6365) as well as local community behavioral health [facility]) crisis number for emergencies ([phone]). Pt to follow up with:  [address] ([facility]) on __08/_22_/18 @ 10:30 AM. Patient will follow up with Dr. Gates Hammans at [facility] for medication management, patient will be seen on _08_/_29_/18 @ 08:00 AM/ for the med management appt. Referral to out patient tobacco cessation counseling treatment:  Made at discharge with (company) on (date) at (time)  Patient refused tobacco cessation counseling    SW offered to assist pt with transportation, pt reports his wife will pick him up.

## 2018-08-21 NOTE — PROGRESS NOTES
Group Therapy Note    Start Time: 0830  End Time:  0900  Number of Participants: 17    Type of Group: Community Meeting       Patient's Goal:  \"[place]. Talking to someone 1 on 1. \"      Notes:      Participation Level:  Active Listener and Interactive       Participation Quality: Appropriate, Attentive, Sharing and Supportive      Thought Process/Content: Logical      Affective Functioning: Congruent      Mood: calm      Level of consciousness:  Alert and Oriented x4      Modes of Intervention: Support      Discipline Responsible: Behavioral Health Tech II      Signature:  Annie Mandujano

## 2018-08-21 NOTE — DISCHARGE SUMMARY
patient in the  past several weeks, he feels sad most of the time with crying episode, lost  interest in his hobbies, woke up in the middle of the night sometime, had  poor appetite, low energy level, increased anger, poor memory, feels  guilty, helpless, worthless, and does not like himself, became anxious,  socially isolated, and suicidal with a plan like shooting himself. His  depression affects his relationship with his wife and makes his job very  challenging lately. He can even barely take care of himself on daily  living activities. Per the patient, he denied any past history of otto or  hypomania. He also denied any past history of psychosis or panic attack,  PTSD, obsessive compulsive disorder, or general anxiety disorder.     SUBSTANCE ABUSE HISTORY:  Per the patient he denied any alcohol abuse,  denied any illegal drugs or prescribed medication abuse. Denied any IV  drug use. Hospital Course: A history and physical was performed which revealed no significant findings. Lab scan revealed the need to replace Vitamin D and B12 which was instituted. Prozac was started for depression and Trazodone for insomnia. The patient tolerated  Both well. The patient was participatory in groups and in the milieu and improved significantly. He developed good skills for coping with life's stresses and his depression. On 8/21/2018 he was ready for discharge. A family meting with his wife was held to clarify expectations and to develop a safety plan. He was discharged afterwards. Participation:good    Vitals:    08/21/18 0713   BP: (!) 109/55   Pulse: 69   Resp: 16   Temp: 97.1 °F (36.2 °C)   SpO2: 100%       Discharge Exam:  Neatly groomed, Less flat affect. Still depressed but less so. Denies SI and HI. LABS:  No results found for this or any previous visit (from the past 72 hour(s)). Consults: Internal medicine. Disposition: home.  Wife is staying temporarily with her father and he will have access

## 2018-08-21 NOTE — PROGRESS NOTES
Progress Note  Mine Ordonez  8/21/2018 12:10 AM  Subjective:   Admit Date:   8/15/2018      CC/ADMIT DX:       Interval History:   Reviewed overnight events and nursing notes. He has no new physical concerns. I have reviewed all labs/diagnostics from the last 24hrs. ROS:   I have done a 10 point ROS and all are negative, except what is mentioned in the HPI. DIET GENERAL;    Medications:      FLUoxetine  30 mg Oral Daily    traZODone  50 mg Oral Nightly    vitamin D  50,000 Units Oral Weekly    vitamin B-12  500 mcg Oral Daily           Objective:   Vitals: /74   Pulse 76   Temp 97.3 °F (36.3 °C) (Temporal)   Resp 14   Wt 175 lb 6.4 oz (79.6 kg)   SpO2 100%  No intake or output data in the 24 hours ending 08/21/18 0010  General appearance: alert and cooperative with exam  Lungs: clear to auscultation bilaterally  Heart: regular rate and rhythm, S1, S2 normal, no murmur, click, rub or gallop  Abdomen: soft, non-tender; bowel sounds normal; no masses,  no organomegaly  Extremities: extremities normal, atraumatic, no cyanosis or edema  Neurologic:  No obvious focal neurologic deficits. Assessment and Plan: Active Problems:    Suicidal ideation    Depression with suicidal ideation  Resolved Problems:    * No resolved hospital problems. *    Vit D Def    Plan:  1. Continue present medication(s)   2. He is medically stable. I will monitor for any changes or concerns. 3.  Follow with Psych      Discharge planning:   home     Reviewed treatment plans with the patient and/or family.              Electronically signed by Josselyn Huitron MD on 8/21/2018 at 12:10 AM

## 2019-01-05 ENCOUNTER — HOSPITAL ENCOUNTER (EMERGENCY)
Facility: HOSPITAL | Age: 24
Discharge: HOME | End: 2019-01-05
Admitting: EMERGENCY MEDICINE

## 2019-01-05 VITALS
DIASTOLIC BLOOD PRESSURE: 87 MMHG | OXYGEN SATURATION: 100 % | HEIGHT: 69 IN | WEIGHT: 185 LBS | HEART RATE: 93 BPM | RESPIRATION RATE: 18 BRPM | BODY MASS INDEX: 27.4 KG/M2 | SYSTOLIC BLOOD PRESSURE: 138 MMHG | TEMPERATURE: 98.5 F

## 2019-01-05 DIAGNOSIS — G50.9 TRIGEMINAL NEUROPATHY: Primary | ICD-10-CM

## 2019-01-05 PROCEDURE — 99282 EMERGENCY DEPT VISIT SF MDM: CPT

## 2019-01-05 RX ORDER — CARBAMAZEPINE 200 MG/1
100 TABLET ORAL 2 TIMES DAILY
Qty: 5 TABLET | Refills: 0 | Status: SHIPPED | OUTPATIENT
Start: 2019-01-05 | End: 2019-01-10

## 2019-01-06 NOTE — DISCHARGE INSTRUCTIONS
It is very important to follow-up with your primary care provider and the dentist as soon as possible for further evaluation of this pain.  If you develop any new, worsening or other concerning symptoms return to the ER immediately.    Follow these instructions at home:  Learn as much as you can about your condition.  Take medicines only as directed by your health care provider.  Work closely with all your health care providers to find what works best for you.  Have a good support system at home.  Consider joining a chronic pain support group.  Contact a health care provider if:  Your pain treatments are not helping.  You are having side effects from your medicines.  You are struggling with fatigue, mood changes, depression, or anxiety.

## 2019-01-06 NOTE — ED PROVIDER NOTES
Subjective   23-year-old presents to the emergency department with left jaw pain.  Patient reports onset of symptoms 4 days prior to arrival.  Patient reports symptoms are intermittent and have been worsening in nature.   denies any initial trauma or injury to the area.  Patient further denies any dental pain, nausea, vomiting, fever, chills, neck pain, previous symptoms.  Patient states that he has at times developed lancinating pain across the left side of his jaw that radiates into his left temporal area.  States that right now all symptoms are resolved and only occurs intermittently in nature.  He took ibuprofen one day ago which he thinks may have slightly relieved his symptoms momentarily.  Patient denies any exacerbating or alleviating factors at this time.        History provided by:  Patient   used: No        Review of Systems   Constitutional: Negative for chills, diaphoresis, fatigue and fever.   HENT: Negative for congestion and trouble swallowing.         Pos Jaw pain     Respiratory: Negative for shortness of breath and wheezing.    Cardiovascular: Negative for chest pain and palpitations.   Gastrointestinal: Negative for abdominal pain, diarrhea and nausea.   Genitourinary: Negative for dysuria.   Musculoskeletal: Negative for arthralgias and myalgias.   Neurological: Negative for dizziness and numbness.   Hematological: Negative for adenopathy. Does not bruise/bleed easily.       History reviewed. No pertinent past medical history.    No Known Allergies    Past Surgical History:   Procedure Laterality Date   • WISDOM TOOTH EXTRACTION         History reviewed. No pertinent family history.    Social History     Socioeconomic History   • Marital status:      Spouse name: Not on file   • Number of children: Not on file   • Years of education: Not on file   • Highest education level: Not on file   Tobacco Use   • Smoking status: Never Smoker   • Smokeless tobacco: Current  "User     Types: Chew   Substance and Sexual Activity   • Alcohol use: Yes     Comment: occ   • Drug use: No       Lab Results (last 24 hours)     ** No results found for the last 24 hours. **          Objective   Physical Exam   Constitutional: He is oriented to person, place, and time. Vital signs are normal. He appears well-developed and well-nourished. No distress.   HENT:   Head: Normocephalic and atraumatic.   Right Ear: External ear normal.   Left Ear: External ear normal.   Mouth/Throat: Uvula is midline and mucous membranes are normal. He does not have dentures. No oral lesions. No trismus in the jaw. Normal dentition. No dental abscesses, uvula swelling, lacerations or dental caries. No tonsillar abscesses. Tonsils are 1+ on the right. Tonsils are 1+ on the left. No tonsillar exudate.   Eyes: Conjunctivae and EOM are normal. Pupils are equal, round, and reactive to light. Right eye exhibits no discharge. Left eye exhibits no discharge. No scleral icterus.   Neck: Normal range of motion. Neck supple. No tracheal deviation present. No thyromegaly present.   Cardiovascular: Normal rate, regular rhythm, normal heart sounds and intact distal pulses. Exam reveals no friction rub.   No murmur heard.  Pulmonary/Chest: Effort normal and breath sounds normal. No respiratory distress. He has no wheezes.   Abdominal: Soft. Bowel sounds are normal. He exhibits no distension. There is no tenderness. There is no guarding.   Neurological: He is alert and oriented to person, place, and time.   Skin: Skin is warm and dry. Capillary refill takes less than 2 seconds. He is not diaphoretic.   Psychiatric: He has a normal mood and affect. His behavior is normal.   Nursing note and vitals reviewed.      Procedures         No orders to display       /87 (BP Location: Right arm, Patient Position: Sitting)   Pulse 93   Temp 98.7 °F (37.1 °C) (Tympanic)   Resp 18   Ht 175.3 cm (69\")   Wt 83.9 kg (185 lb)   SpO2 100%   " BMI 27.32 kg/m²     ED Course         Medications - No data to display         MDM  Number of Diagnoses or Management Options  Diagnosis management comments: This is a 23-year-old  male that presents with paroxysms of unilateral pain of the left jaw area that seems to be described as trigeminal nerve distribution lasting only a few minutes at a time.  He has a normal neuro exam and dental exam is unremarkable.  Does have a history of caries but at this time no obvious periapical abscess or gingival erythema to cause intermittent pain.  Patient is afebrile, no neck pain, no headache at this time.  We will treat as if this is trigeminal neuralgia prophylactically for 5 days.  Strongly encouraged patient to follow up with our [facility] care as soon as possible and to have a dental examination in the next few days.  At time of discharge no acute distress still pain free normal vital signs.  Discussed this case with Dr. Frank who recommends treatment with Tegretol.    Risk of Complications, Morbidity, and/or Mortality  Presenting problems: low  Diagnostic procedures: low  Management options: low    Patient Progress  Patient progress: improved      Final diagnoses:   Trigeminal neuropathy          Carlos Madden PA-C  01/05/19 6553

## 2020-03-03 ENCOUNTER — OFFICE VISIT (OUTPATIENT)
Dept: FAMILY MEDICINE CLINIC | Age: 25
End: 2020-03-03
Payer: COMMERCIAL

## 2020-03-03 VITALS
HEART RATE: 65 BPM | TEMPERATURE: 96.9 F | WEIGHT: 182 LBS | SYSTOLIC BLOOD PRESSURE: 104 MMHG | HEIGHT: 69 IN | BODY MASS INDEX: 26.96 KG/M2 | OXYGEN SATURATION: 99 % | DIASTOLIC BLOOD PRESSURE: 68 MMHG

## 2020-03-03 PROBLEM — E53.8 VITAMIN B12 DEFICIENCY: Status: ACTIVE | Noted: 2020-03-03

## 2020-03-03 PROBLEM — E55.9 VITAMIN D DEFICIENCY: Status: ACTIVE | Noted: 2020-03-03

## 2020-03-03 PROBLEM — F41.1 GAD (GENERALIZED ANXIETY DISORDER): Status: ACTIVE | Noted: 2020-03-03

## 2020-03-03 PROBLEM — F33.40 MDD (RECURRENT MAJOR DEPRESSIVE DISORDER) IN REMISSION (HCC): Status: ACTIVE | Noted: 2020-03-03

## 2020-03-03 PROBLEM — E66.3 OVERWEIGHT (BMI 25.0-29.9): Status: ACTIVE | Noted: 2020-03-03

## 2020-03-03 PROBLEM — Z72.0 SMOKELESS TOBACCO USE: Status: ACTIVE | Noted: 2020-03-03

## 2020-03-03 PROCEDURE — 99204 OFFICE O/P NEW MOD 45 MIN: CPT | Performed by: FAMILY MEDICINE

## 2020-03-03 ASSESSMENT — ANXIETY QUESTIONNAIRES
3. WORRYING TOO MUCH ABOUT DIFFERENT THINGS: 3-NEARLY EVERY DAY
5. BEING SO RESTLESS THAT IT IS HARD TO SIT STILL: 1-SEVERAL DAYS
4. TROUBLE RELAXING: 1-SEVERAL DAYS
1. FEELING NERVOUS, ANXIOUS, OR ON EDGE: 1-SEVERAL DAYS
GAD7 TOTAL SCORE: 13
7. FEELING AFRAID AS IF SOMETHING AWFUL MIGHT HAPPEN: 3-NEARLY EVERY DAY
2. NOT BEING ABLE TO STOP OR CONTROL WORRYING: 3-NEARLY EVERY DAY
6. BECOMING EASILY ANNOYED OR IRRITABLE: 1-SEVERAL DAYS

## 2020-03-03 ASSESSMENT — PATIENT HEALTH QUESTIONNAIRE - PHQ9
SUM OF ALL RESPONSES TO PHQ QUESTIONS 1-9: 0
SUM OF ALL RESPONSES TO PHQ9 QUESTIONS 1 & 2: 0
SUM OF ALL RESPONSES TO PHQ QUESTIONS 1-9: 0
2. FEELING DOWN, DEPRESSED OR HOPELESS: 0
1. LITTLE INTEREST OR PLEASURE IN DOING THINGS: 0

## 2020-03-03 NOTE — PROGRESS NOTES
[facility] [address] [place] [address]  [address] [place] [address]  Dept: [phone]  Dept Fax: [phone]  Loc: [phone]    Subjective:     Rosa Dang is a 25 y.o. male who presents today for his medical conditions/complaints as noted below. Rosa Dang is c/o of New Patient        HPI:   Patient presents to [facility] care. He has not had a PCP in quite some time. He has no concerns today. Chart review shows that he does have a history of depression, and was actually admitted to [facility] in August 2018, for depression with suicidal ideations. He stated the time he had plan to shoot himself. He was started on Prozac and trazodone, and recommended to follow-up at [facility]. He says he did follow-up, took medications for about a year, then decided to stop taking them. His depression and anxiety screens are as noted below. He denies any suicidal thoughts. He is still doing counseling with Devan Tejeda at [facility], about every 2 weeks. He was previously on B12 and vitamin D prescriptions as well. PHQ Scores 3/3/2020   PHQ2 Score 0   PHQ9 Score 0     Interpretation of Total Score Depression Severity: 1-4 = Minimal depression, 5-9 = Mild depression, 10-14 = Moderate depression, 15-19 = Moderately severe depression, 20-27 = Severe depression     OTIS 7 SCORE 3/3/2020   OTIS-7 Total Score 13     Interpretation of OTIS-7 score: 5-9 = mild anxiety, 10-14 = moderate anxiety, 15+ = severe anxiety. Recommend referral to behavioral health for scores 10 or greater. Past Medical History:   Diagnosis Date    Anxiety     Depression      History reviewed. No pertinent surgical history.     Family History   Problem Relation Age of Onset    Depression Mother     Cancer Father     Depression Brother     High Blood Pressure Brother     Depression Maternal Grandmother        Social History     Tobacco Use    Smoking status: Never Smoker Breath sounds: Normal breath sounds. Abdominal:      General: Bowel sounds are normal. There is no distension. Palpations: Abdomen is soft. There is no hepatomegaly. Tenderness: There is no abdominal tenderness. Musculoskeletal: Normal range of motion. Skin:     General: Skin is warm and dry. Capillary Refill: Capillary refill takes less than 2 seconds. Neurological:      Mental Status: He is alert and oriented to person, place, and time. Cranial Nerves: No cranial nerve deficit. Psychiatric:         Behavior: Behavior normal.           Lab Review   No results found for this or any previous visit (from the past 672 hour(s)). Assessment & Plan: The following diagnoses and conditions are stable with no further orders unless indicated:    Patient Active Problem List    Diagnosis Date Noted    Vitamin B12 deficiency 03/03/2020     Overview Note:     Check labs.  Vitamin D deficiency 03/03/2020     Overview Note:     Check labs.  Smokeless tobacco use 03/03/2020     Overview Note:     Tobacco Use:  Spent 5 minutes discussing smoking cessation, separate of total office visit time documented elsewhere. Discussed health issues attributed to tobacco use. Discussed medication options including nicotine replacement, Wellbutrin, and Chantix. Discussed calling [employer]-QUIT-NOW for further assistance. Recommended picking a quit date. Will discuss further at next appointment.  OTIS (generalized anxiety disorder) 03/03/2020     Overview Note:     Patient does not wish to restart medication at this time. Continue counseling.  Overweight (BMI 25.0-29.9) 03/03/2020     Overview Note:     Recommended at least 150 minutes of exercise per week and resistance training 2 days a week. Discussed healthy diet habits. Offered suggestions for calorie counting.         MDD (recurrent major depressive disorder) in remission ([facility] [address] 03/03/2020    Adjustment disorder with mixed emotional features 08/21/2018        Marbury Aurora was seen today for new patient. Diagnoses and all orders for this visit:    MDD (recurrent major depressive disorder) in remission ([facility] [address]  -     CBC Auto Differential; Future  -     Comprehensive Metabolic Panel; Future    Adjustment disorder with mixed emotional features    Vitamin B12 deficiency  -     Vitamin B12; Future    Vitamin D deficiency  -     Vitamin D 25 Hydroxy; Future    Screening for HIV (human immunodeficiency virus)    Smokeless tobacco use    OTIS (generalized anxiety disorder)    Overweight (BMI 25.0-29. 9)        Health Maintenance   Topic Date Due    HPV vaccine (1 - Male 2-dose series) 09/01/2006    Flu vaccine (1) 03/03/2021 (Originally 9/1/2019)    DTaP/Tdap/Td vaccine (2 - Td) 01/01/2026    Shingles Vaccine (1 of 2) 09/01/2045    HIV screen  Completed    Hepatitis A vaccine  Aged Out    Hepatitis B vaccine  Aged Out    Hib vaccine  Aged Out    Meningococcal (ACWY) vaccine  Aged Out    Pneumococcal 0-64 years Vaccine  Aged Out    Varicella vaccine  Discontinued     Declined flu, UTD or previously declined the rest.    Return in about 3 months (around 6/3/2020) for Anxiety. Discussed use, benefit, and side effects of prescribed medications. All patient questions answered. Pt voiced understanding. Reviewed health maintenance. Instructed to continue current medications, diet and exercise. Patient agreedwith treatment plan. Follow up as directed. Old records reviewed, where available.     Willam Riedel, MD    Note:  dictated using Dragon software

## 2020-03-09 PROBLEM — R45.851 SUICIDAL IDEATION: Status: RESOLVED | Noted: 2018-08-16 | Resolved: 2020-03-09

## 2020-03-09 ASSESSMENT — ENCOUNTER SYMPTOMS
COLOR CHANGE: 0
BACK PAIN: 0
FACIAL SWELLING: 0
EYE ITCHING: 0
EYE DISCHARGE: 0
VOMITING: 0
NAUSEA: 0
APNEA: 0
STRIDOR: 0

## 2021-05-03 ENCOUNTER — TELEMEDICINE (OUTPATIENT)
Dept: FAMILY MEDICINE CLINIC | Age: 26
End: 2021-05-03
Payer: OTHER GOVERNMENT

## 2021-05-03 DIAGNOSIS — F41.1 GAD (GENERALIZED ANXIETY DISORDER): Primary | ICD-10-CM

## 2021-05-03 DIAGNOSIS — E66.3 OVERWEIGHT (BMI 25.0-29.9): ICD-10-CM

## 2021-05-03 DIAGNOSIS — F33.40 MDD (RECURRENT MAJOR DEPRESSIVE DISORDER) IN REMISSION (HCC): ICD-10-CM

## 2021-05-03 DIAGNOSIS — E53.8 VITAMIN B12 DEFICIENCY: ICD-10-CM

## 2021-05-03 DIAGNOSIS — E55.9 VITAMIN D DEFICIENCY: ICD-10-CM

## 2021-05-03 DIAGNOSIS — F43.29 ADJUSTMENT DISORDER WITH MIXED EMOTIONAL FEATURES: ICD-10-CM

## 2021-05-03 PROCEDURE — 99214 OFFICE O/P EST MOD 30 MIN: CPT | Performed by: FAMILY MEDICINE

## 2021-05-03 ASSESSMENT — ANXIETY QUESTIONNAIRES
3. WORRYING TOO MUCH ABOUT DIFFERENT THINGS: 2-OVER HALF THE DAYS
7. FEELING AFRAID AS IF SOMETHING AWFUL MIGHT HAPPEN: 2-OVER HALF THE DAYS
GAD7 TOTAL SCORE: 9
4. TROUBLE RELAXING: 0-NOT AT ALL
6. BECOMING EASILY ANNOYED OR IRRITABLE: 1-SEVERAL DAYS

## 2021-05-03 ASSESSMENT — PATIENT HEALTH QUESTIONNAIRE - PHQ9
SUM OF ALL RESPONSES TO PHQ QUESTIONS 1-9: 1
SUM OF ALL RESPONSES TO PHQ9 QUESTIONS 1 & 2: 1

## 2021-05-03 NOTE — PROGRESS NOTES
Aries Kimble (:  1995) is a 22 y.o. male,Established patient, here for evaluation of the following chief complaint(s): Fatigue      ASSESSMENT/PLAN:  1. OTIS (generalized anxiety disorder)  2. MDD (recurrent major depressive disorder) in remission ([facility] [address]  -     CBC Auto Differential; Future  -     Comprehensive Metabolic Panel; Future  -     TSH WITH REFLEX TO FT4; Future  3. Vitamin B12 deficiency  -     Vitamin B12; Future  4. Vitamin D deficiency  -     Vitamin D 25 Hydroxy; Future  5. Overweight (BMI 25.0-29.9)  6. Adjustment disorder with mixed emotional features      Return in about 2 months (around 7/3/2021) for Anxiety/depression, virtual visit. SUBJECTIVE/OBJECTIVE:  HPI  Patient presents with concerns of fatigue and depression. Previously was admitted to  for suicidal ideation. His last appt with me was in 2020 when he established care, he states that in between he was living in [place] for 6 months. He is no longer taking any medications (including Vitamin D or B12), but has made an appt with Tereza Means for therapy. He is starting a new job though and will need to re-schedule. His lab orders from last year have . His depression screen is actually negative today. PHQ Scores 5/3/2021 3/3/2020   PHQ2 Score 1 0   PHQ9 Score 1 0     Interpretation of Total Score Depression Severity: 1-4 = Minimal depression, 5-9 = Mild depression, 10-14 = Moderate depression, 15-19 = Moderately severe depression, 20-27 = Severe depression    OTIS 7 SCORE 5/3/2021 3/3/2020   OTIS-7 Total Score 9 13     Interpretation of OTIS-7 score: 5-9 = mild anxiety, 10-14 = moderate anxiety, 15+ = severe anxiety. Recommend referral to behavioral health for scores 10 or greater. Review of Systems   Constitutional: Positive for fatigue. Negative for chills and fever. HENT: Negative for facial swelling and mouth sores. Eyes: Negative for discharge and itching.    Respiratory: Negative for apnea and stridor. Cardiovascular: Negative for chest pain and palpitations. Gastrointestinal: Negative for nausea and vomiting. Endocrine: Negative for cold intolerance and heat intolerance. Genitourinary: Negative for frequency and urgency. Musculoskeletal: Negative for arthralgias and back pain. Skin: Negative for color change and rash.          Patient-Reported Vitals 5/3/2021   Patient-Reported Weight 185   Patient-Reported Height 58   Patient-Reported Temperature 98.3        Physical Exam    [INSTRUCTIONS:  \"[x]\" Indicates a positive item  \"[]\" Indicates a negative item  -- DELETE ALL ITEMS NOT EXAMINED]    Constitutional: [x] Appears well-developed and well-nourished [x] No apparent distress      [] Abnormal -     Mental status: [x] Alert and awake  [x] Oriented to person/place/time [x] Able to follow commands    [] Abnormal -     Eyes:   EOM    [x]  Normal    [] Abnormal -   Sclera  [x]  Normal    [] Abnormal -          Discharge [x]  None visible   [] Abnormal -     HENT: [x] Normocephalic, atraumatic  [] Abnormal -   [x] Mouth/Throat: Mucous membranes are moist    External Ears [x] Normal  [] Abnormal -    Neck: [x] No visualized mass [] Abnormal -     Pulmonary/Chest: [x] Respiratory effort normal   [x] No visualized signs of difficulty breathing or respiratory distress        [] Abnormal -      Musculoskeletal:   [x] Normal gait with no signs of ataxia         [x] Normal range of motion of neck        [] Abnormal -     Neurological:        [x] No Facial Asymmetry (Cranial nerve 7 motor function) (limited exam due to video visit)          [x] No gaze palsy        [] Abnormal -          Skin:        [x] No significant exanthematous lesions or discoloration noted on facial skin         [] Abnormal -            Psychiatric:       [x] Normal Affect [] Abnormal -        [x] No Hallucinations    Other pertinent observable physical exam findings:-                Mukeshia Gist, was evaluated through a

## 2021-05-09 ASSESSMENT — ENCOUNTER SYMPTOMS
NAUSEA: 0
FACIAL SWELLING: 0
VOMITING: 0
COLOR CHANGE: 0
EYE ITCHING: 0
EYE DISCHARGE: 0
APNEA: 0
STRIDOR: 0
BACK PAIN: 0

## 2021-06-03 DIAGNOSIS — F33.40 MDD (RECURRENT MAJOR DEPRESSIVE DISORDER) IN REMISSION (HCC): ICD-10-CM

## 2021-06-03 DIAGNOSIS — E55.9 VITAMIN D DEFICIENCY: ICD-10-CM

## 2021-06-03 DIAGNOSIS — E53.8 VITAMIN B12 DEFICIENCY: ICD-10-CM

## 2021-06-03 LAB
ALBUMIN SERPL-MCNC: 4.8 G/DL (ref 3.5–5.2)
ALP BLD-CCNC: 57 U/L (ref 40–130)
ALT SERPL-CCNC: 11 U/L (ref 5–41)
ANION GAP SERPL CALCULATED.3IONS-SCNC: 8 MMOL/L (ref 7–19)
AST SERPL-CCNC: 12 U/L (ref 5–40)
BASOPHILS ABSOLUTE: 0.1 K/UL (ref 0–0.2)
BASOPHILS RELATIVE PERCENT: 1.1 % (ref 0–1)
BILIRUB SERPL-MCNC: 0.7 MG/DL (ref 0.2–1.2)
BUN BLDV-MCNC: 16 MG/DL (ref 6–20)
CALCIUM SERPL-MCNC: 9.5 MG/DL (ref 8.6–10)
CHLORIDE BLD-SCNC: 102 MMOL/L (ref 98–111)
CO2: 31 MMOL/L (ref 22–29)
CREAT SERPL-MCNC: 1 MG/DL (ref 0.5–1.2)
EOSINOPHILS ABSOLUTE: 0.1 K/UL (ref 0–0.6)
EOSINOPHILS RELATIVE PERCENT: 2.3 % (ref 0–5)
GFR AFRICAN AMERICAN: >59
GFR NON-AFRICAN AMERICAN: >60
GLUCOSE BLD-MCNC: 98 MG/DL (ref 74–109)
HCT VFR BLD CALC: 46.4 % (ref 42–52)
HEMOGLOBIN: 15.7 G/DL (ref 14–18)
IMMATURE GRANULOCYTES #: 0 K/UL
LYMPHOCYTES ABSOLUTE: 2.7 K/UL (ref 1.1–4.5)
LYMPHOCYTES RELATIVE PERCENT: 48.3 % (ref 20–40)
MCH RBC QN AUTO: 29.5 PG (ref 27–31)
MCHC RBC AUTO-ENTMCNC: 33.8 G/DL (ref 33–37)
MCV RBC AUTO: 87.1 FL (ref 80–94)
MONOCYTES ABSOLUTE: 0.5 K/UL (ref 0–0.9)
MONOCYTES RELATIVE PERCENT: 8 % (ref 0–10)
NEUTROPHILS ABSOLUTE: 2.3 K/UL (ref 1.5–7.5)
NEUTROPHILS RELATIVE PERCENT: 39.9 % (ref 50–65)
PDW BLD-RTO: 11.9 % (ref 11.5–14.5)
PLATELET # BLD: 204 K/UL (ref 130–400)
PMV BLD AUTO: 11.3 FL (ref 9.4–12.4)
POTASSIUM SERPL-SCNC: 4.3 MMOL/L (ref 3.5–5)
RBC # BLD: 5.33 M/UL (ref 4.7–6.1)
SODIUM BLD-SCNC: 141 MMOL/L (ref 136–145)
T4 FREE: 1.41 NG/DL (ref 0.93–1.7)
TOTAL PROTEIN: 7.4 G/DL (ref 6.6–8.7)
TSH REFLEX FT4: 4.65 UIU/ML (ref 0.35–5.5)
VITAMIN B-12: 360 PG/ML (ref 211–946)
VITAMIN D 25-HYDROXY: 18.1 NG/ML
WBC # BLD: 5.7 K/UL (ref 4.8–10.8)

## 2021-07-28 ENCOUNTER — OFFICE VISIT (OUTPATIENT)
Dept: FAMILY MEDICINE CLINIC | Age: 26
End: 2021-07-28
Payer: OTHER GOVERNMENT

## 2021-07-28 VITALS
HEART RATE: 79 BPM | BODY MASS INDEX: 28.49 KG/M2 | HEIGHT: 68 IN | DIASTOLIC BLOOD PRESSURE: 68 MMHG | OXYGEN SATURATION: 98 % | TEMPERATURE: 97.4 F | SYSTOLIC BLOOD PRESSURE: 110 MMHG | WEIGHT: 188 LBS

## 2021-07-28 DIAGNOSIS — J01.10 ACUTE NON-RECURRENT FRONTAL SINUSITIS: Primary | ICD-10-CM

## 2021-07-28 PROCEDURE — 99213 OFFICE O/P EST LOW 20 MIN: CPT | Performed by: CLINICAL NURSE SPECIALIST

## 2021-07-28 RX ORDER — METHYLPREDNISOLONE 4 MG/1
TABLET ORAL
Qty: 21 TABLET | Refills: 0 | Status: SHIPPED | OUTPATIENT
Start: 2021-07-28 | End: 2021-08-03

## 2021-07-28 ASSESSMENT — ENCOUNTER SYMPTOMS
SINUS PRESSURE: 1
VOMITING: 0
FACIAL SWELLING: 0
NAUSEA: 0
SHORTNESS OF BREATH: 0
COUGH: 0
EYE DISCHARGE: 0
SORE THROAT: 0
EYE PAIN: 0
WHEEZING: 0
CHEST TIGHTNESS: 0
RHINORRHEA: 0
SINUS PAIN: 0

## 2021-07-28 NOTE — PROGRESS NOTES
and headaches. Negative for weakness. OBJECTIVE:  /68   Pulse 79   Temp 97.4 °F (36.3 °C)   Ht 5' 8\" (1.727 m)   Wt 188 lb (85.3 kg)   SpO2 98%   BMI 28.59 kg/m²    Physical Exam  Vitals reviewed. Constitutional:       General: He is not in acute distress. Appearance: He is well-developed. He is not ill-appearing or toxic-appearing. HENT:      Head: Normocephalic and atraumatic. Right Ear: Tympanic membrane normal.      Left Ear: Tympanic membrane, ear canal and external ear normal.      Ears:      Comments: Right canal erythematous     Nose: Nose normal. No congestion or rhinorrhea. Mouth/Throat:      Pharynx: No oropharyngeal exudate. Eyes:      General:         Right eye: No discharge. Left eye: No discharge. Conjunctiva/sclera: Conjunctivae normal.      Pupils: Pupils are equal, round, and reactive to light. Cardiovascular:      Rate and Rhythm: Normal rate and regular rhythm. Pulmonary:      Effort: Pulmonary effort is normal. No respiratory distress. Breath sounds: Normal breath sounds. No wheezing or rales. Musculoskeletal:         General: Normal range of motion. Cervical back: Normal range of motion and neck supple. Right lower leg: No edema. Left lower leg: No edema. Lymphadenopathy:      Cervical: No cervical adenopathy. Skin:     General: Skin is warm and dry. Findings: No rash. Neurological:      Mental Status: He is alert and oriented to person, place, and time. Mental status is at baseline. Psychiatric:         Mood and Affect: Mood normal.         Behavior: Behavior normal.         Thought Content: Thought content normal.         Judgment: Judgment normal.         ASSESSMENT/PLAN:  1.  Acute non-recurrent frontal sinusitis  Suspected given mostly negative exam and subjective findings  rx MDP, take otc 24 hour antihistamine  Keep symptom diary   If ongoing next week, will need work up  ZACHARY Troy ok.   - methylPREDNISolone (MEDROL DOSEPACK) 4 MG tablet; Take by mouth. Dispense: 21 tablet; Refill: 0          Return for already scheduled.

## 2021-07-29 ENCOUNTER — OFFICE VISIT (OUTPATIENT)
Age: 26
End: 2021-07-29

## 2021-07-29 ENCOUNTER — OFFICE VISIT (OUTPATIENT)
Dept: URGENT CARE | Age: 26
End: 2021-07-29
Payer: OTHER GOVERNMENT

## 2021-07-29 VITALS
RESPIRATION RATE: 20 BRPM | SYSTOLIC BLOOD PRESSURE: 109 MMHG | OXYGEN SATURATION: 97 % | HEART RATE: 120 BPM | BODY MASS INDEX: 28.19 KG/M2 | WEIGHT: 186 LBS | HEIGHT: 68 IN | TEMPERATURE: 99.4 F | DIASTOLIC BLOOD PRESSURE: 73 MMHG

## 2021-07-29 DIAGNOSIS — B34.9 VIRAL INFECTION: Primary | ICD-10-CM

## 2021-07-29 DIAGNOSIS — Z11.59 SCREENING FOR VIRAL DISEASE: Primary | ICD-10-CM

## 2021-07-29 DIAGNOSIS — J02.9 SORE THROAT: ICD-10-CM

## 2021-07-29 LAB — S PYO AG THROAT QL: NORMAL

## 2021-07-29 PROCEDURE — 99999 PR OFFICE/OUTPT VISIT,PROCEDURE ONLY: CPT | Performed by: NURSE PRACTITIONER

## 2021-07-29 PROCEDURE — 99213 OFFICE O/P EST LOW 20 MIN: CPT | Performed by: NURSE PRACTITIONER

## 2021-07-29 PROCEDURE — 87880 STREP A ASSAY W/OPTIC: CPT | Performed by: NURSE PRACTITIONER

## 2021-07-29 ASSESSMENT — ENCOUNTER SYMPTOMS: SORE THROAT: 1

## 2021-07-29 NOTE — PATIENT INSTRUCTIONS
Patient Education        Viral Infections: Care Instructions  Your Care Instructions     You don't feel well, but it's not clear what's causing it. You may have a viral infection. Viruses cause many illnesses, such as the common cold, influenza, fever, rashes, and the diarrhea, nausea, and vomiting that are often called \"stomach flu. \" You may wonder if antibiotic medicines could make you feel better. But antibiotics only treat infections caused by bacteria. They don't work on viruses. The good news is that viral infections usually aren't serious. Most will go away in a few days without medical treatment. In the meantime, there are a few things you can do to make yourself more comfortable. Follow-up care is a key part of your treatment and safety. Be sure to make and go to all appointments, and call your doctor if you are having problems. It's also a good idea to know your test results and keep a list of the medicines you take. How can you care for yourself at home? · Get plenty of rest if you feel tired. · Take an over-the-counter pain medicine if needed, such as acetaminophen (Tylenol), ibuprofen (Advil, Motrin), or naproxen (Aleve). Read and follow all instructions on the label. · Be careful when taking over-the-counter cold or flu medicines and Tylenol at the same time. Many of these medicines have acetaminophen, which is Tylenol. Read the labels to make sure that you are not taking more than the recommended dose. Too much acetaminophen (Tylenol) can be harmful. · Drink plenty of fluids. If you have kidney, heart, or liver disease and have to limit fluids, talk with your doctor before you increase the amount of fluids you drink. · Stay home from work, school, and other public places while you have a fever. When should you call for help? Call 911 anytime you think you may need emergency care. For example, call if:    · You have severe trouble breathing.     · You passed out (lost consciousness). Call your doctor now or seek immediate medical care if:    · You seem to be getting much sicker.     · You have a new or higher fever.     · You have blood in your stools.     · You have new belly pain, or your pain gets worse.     · You have a new rash. Watch closely for changes in your health, and be sure to contact your doctor if:    · You start to get better and then get worse.     · You do not get better as expected. Where can you learn more? Go to [email or web address]://[employer] [email or web address].rimidi. org and sign in to your Mobile[employer] account. Enter O661 in the [facility] box to learn more about \"Viral Infections: Care Instructions. \"     If you do not have an account, please click on the \"Sign Up Now\" link. Current as of: September 23, 2020               Content Version: 12.9  © 4470-5506 [employer]. Care instructions adapted under license by [facility] [address] If you have questions about a medical condition or this instruction, always ask your healthcare professional. [place] [address] any warranty or liability for your use of this information. Patient Education        Learning About Coronavirus [phone])  What is coronavirus (COVID-19)? COVID-19 is a disease caused by a new type of coronavirus. This illness was first found in December 2019. It has since spread worldwide. Coronaviruses are a large group of viruses. They cause the common cold. They also cause more serious illnesses like Middle East respiratory syndrome (MERS) and severe acute respiratory syndrome (SARS). COVID-19 is caused by a novel coronavirus. That means it's a new type that has not been seen in people before. What are the symptoms? Coronavirus (COVID-19) symptoms may include:  · Fever. · Cough. · Trouble breathing. · Chills or repeated shaking with chills. · Muscle pain. · Headache. · Sore throat. · New loss of taste or smell. · Vomiting. · Diarrhea.   In severe cases, COVID-19 can cause pneumonia and make it hard to breathe without help from a machine. It can cause death. How is it diagnosed? COVID-19 is diagnosed with a viral test. This may also be called a PCR test or antigen test. It looks for evidence of the virus in your breathing passages or lungs (respiratory system). The test is most often done on a sample from the nose, throat, or lungs. It's sometimes done on a sample of saliva. One way a sample is collected is by putting a long swab into the back of your nose. How is it treated? Mild cases of COVID-19 can be treated at home. Serious cases need treatment in the hospital. Treatment may include medicines to reduce symptoms, plus breathing support such as oxygen therapy or a ventilator. Some people may be placed on their belly to help their oxygen levels. Treatments that may help people who have COVID-19 include:  Antiviral medicines. These medicines treat viral infections. Remdesivir is an example. Immune-based therapy. These medicines help the immune system fight COVID-19. One example is bamlanivimab. It's a monoclonal antibody. Blood thinners. These medicines help prevent blood clots. People with severe illness are at risk for blood clots. How can you protect yourself and others? The best way to protect yourself from getting sick is to:  · Avoid areas where there is an outbreak. · Avoid contact with people who may be infected. · Avoid crowds and try to stay at least 6 feet away from other people. · Wash your hands often, especially after you cough or sneeze. Use soap and water, and scrub for at least 20 seconds. If soap and water aren't available, use an alcohol-based hand . · Avoid touching your mouth, nose, and eyes. To help avoid spreading the virus to others:  · Stay home if you are sick or have been exposed to the virus. Don't go to school, work, or public areas.  And don't use public transportation, ride-shares, or taxis unless you have no choice. · Wear a cloth face cover if you have to go to public areas. · Cover your mouth with a tissue when you cough or sneeze. Then throw the tissue in the trash and wash your hands right away. · If you're sick:  ? Leave your home only if you need to get medical care. But call the doctor's office first so they know you're coming. And wear a face cover. ? Wear the face cover whenever you're around other people. It can help stop the spread of the virus. ? Limit contact with pets and people in your home. If possible, stay in a separate bedroom and use a separate bathroom. ? Clean and disinfect your home every day. Use household  and disinfectant wipes or sprays. Take special care to clean things that you grab with your hands. These include doorknobs, remote controls, phones, and handles on your refrigerator and microwave. And don't forget countertops, tabletops, bathrooms, and computer keyboards. When should you call for help? Call 911 anytime you think you may need emergency care. For example, call if you have life-threatening symptoms, such as:    · You have severe trouble breathing. (You can't talk at all.)     · You have constant chest pain or pressure.     · You are severely dizzy or lightheaded.     · You are confused or can't think clearly.     · Your face and lips have a blue color.     · You pass out (lose consciousness) or are very hard to wake up. Call your doctor now or seek immediate medical care if:    · You have moderate trouble breathing. (You can't speak a full sentence.)     · You are coughing up blood (more than about 1 teaspoon).     · You have signs of low blood pressure. These include feeling lightheaded; being too weak to stand; and having cold, pale, clammy skin. Watch closely for changes in your health, and be sure to contact your doctor if:    · Your symptoms get worse.     · You are not getting better as expected. Call before you go to the doctor's office.   Follow their instructions. And wear a cloth face cover. Current as of: March 26, 2021               Content Version: 12.9  © 2006-2021 [employer]. Care instructions adapted under license by [facility] If you have questions about a medical condition or this instruction, always ask your healthcare professional. [place] any warranty or liability for your use of this information. 1. Rest and increase fluid intake. Drink water, Gatorade, and other liquids like broths. 2. Covid-19 testing. Quarantine at home until results are called to you. If positive you will have to quarantine for 14days. If positive the health dept will also contact you. 3. Monitor for fever and treat as needed with tylenol or ibuprofen  4. If patient is not improving or developing any new/worsening symptoms then return to clinic as needed or go to ER. Patient is to follow up with PCP as needed. 5. If you haven't urinated in 12-16 hours go to ER or you feel that you are unable to tolerate increasing fluid intake go to ER.

## 2021-07-29 NOTE — PROGRESS NOTES
[address] [facility]  [address] [address] [place] [address]  Dept: [phone]  Dept Fax: [phone]  Loc: [phone]    Racquel Navarro is a 22 y.o. male who presents today for his medical conditions/complaintsas noted below. Racquel Navarro is c/o of Headache (yesterday, other s/s onset today ), Pharyngitis, Fever, and Chills        HPI:     Fever   This is a new problem. The current episode started today. The problem occurs intermittently. The problem has been unchanged. Associated symptoms include headaches (started yesterday) and a sore throat. Pertinent negatives include no congestion or rash. Associated symptoms comments: Chills  . He has tried acetaminophen for the symptoms. The treatment provided no relief. Past Medical History:   Diagnosis Date    Anxiety     Depression      No past surgical history on file. Family History   Problem Relation Age of Onset    Depression Mother     Cancer Father     Depression Brother     High Blood Pressure Brother     Depression Maternal Grandmother        Social History     Tobacco Use    Smoking status: Never Smoker    Smokeless tobacco: Current User     Types: Snuff   Substance Use Topics    Alcohol use: No      Current Outpatient Medications   Medication Sig Dispense Refill    methylPREDNISolone (MEDROL DOSEPACK) 4 MG tablet Take by mouth. (Patient not taking: Reported on 7/29/2021) 21 tablet 0     No current facility-administered medications for this visit.      Allergies   Allergen Reactions    Azithromycin        Health Maintenance   Topic Date Due    HPV vaccine (1 - Male 2-dose series) Never done    COVID-19 Vaccine (1) Never done    Flu vaccine (1) 09/01/2021    DTaP/Tdap/Td vaccine (2 - Td or Tdap) 01/01/2026    HIV screen  Completed    Hepatitis A vaccine  Aged Out    Hepatitis B vaccine  Aged Out    Hib vaccine  Aged Out    Meningococcal (ACWY) vaccine  Aged Out    Pneumococcal 0-64 years Vaccine  Aged Out    Varicella vaccine  Discontinued    Hepatitis C screen  Discontinued       Subjective:     Review of Systems   Constitutional: Positive for fever. Negative for chills. HENT: Positive for sore throat. Negative for congestion. Skin: Negative for rash. Neurological: Positive for headaches (started yesterday). All other systems reviewed and are negative.      :Objective      Physical Exam  Vitals and nursing note reviewed. Constitutional:       General: He is not in acute distress. Appearance: Normal appearance. He is well-developed. He is ill-appearing. He is not diaphoretic. HENT:      Head: Normocephalic and atraumatic. Right Ear: Tympanic membrane, ear canal and external ear normal.      Left Ear: Tympanic membrane, ear canal and external ear normal.      Nose: Nose normal.      Mouth/Throat:      Mouth: Mucous membranes are moist.      Pharynx: Oropharynx is clear. Eyes:      Conjunctiva/sclera: Conjunctivae normal.      Pupils: Pupils are equal, round, and reactive to light. Cardiovascular:      Rate and Rhythm: Normal rate and regular rhythm. Heart sounds: Normal heart sounds. No murmur heard. Pulmonary:      Effort: Pulmonary effort is normal. No respiratory distress. Breath sounds: Normal breath sounds. No wheezing. Skin:     General: Skin is warm and dry. Findings: No rash. Neurological:      Mental Status: He is alert and oriented to person, place, and time. Psychiatric:         Mood and Affect: Mood normal.         Behavior: Behavior normal.       /73   Pulse 120   Temp 99.4 °F (37.4 °C) (Temporal)   Resp 20   Ht 5' 8\" (1.727 m)   Wt 186 lb (84.4 kg)   SpO2 97%   BMI 28.28 kg/m²     :Assessment       Diagnosis Orders   1. Viral infection     2. Sore throat  POCT rapid strep A       :Plan    1. Rest and increase fluid intake. Drink water, Gatorade, and other liquids like broths. 2. Covid-19 testing.  Quarantine at home until results are called to you. If positive you will have to quarantine for 14days. If positive the health dept will also contact you. 3. Monitor for fever and treat as needed with tylenol or ibuprofen  4. If patient is not improving or developing any new/worsening symptoms then return to clinic as needed or go to ER. Patient is to follow up with PCP as needed. 5. If you haven't urinated in 12-16 hours go to ER or you feel that you are unable to tolerate increasing fluid intake go to ER. Orders Placed This Encounter   Procedures    POCT rapid strep A     Results for orders placed or performed in visit on 07/29/21   POCT rapid strep A   Result Value Ref Range    Strep A Ag None Detected None Detected         No follow-ups on file. No orders of the defined types were placed in this encounter. Patient given educational materials- see patient instructions. Discussed use, benefit, and side effects of prescribedmedications. All patient questions answered. Pt voiced understanding. Patient Instructions       Patient Education        Viral Infections: Care Instructions  Your Care Instructions     You don't feel well, but it's not clear what's causing it. You may have a viral infection. Viruses cause many illnesses, such as the common cold, influenza, fever, rashes, and the diarrhea, nausea, and vomiting that are often called \"stomach flu. \" You may wonder if antibiotic medicines could make you feel better. But antibiotics only treat infections caused by bacteria. They don't work on viruses. The good news is that viral infections usually aren't serious. Most will go away in a few days without medical treatment. In the meantime, there are a few things you can do to make yourself more comfortable. Follow-up care is a key part of your treatment and safety. Be sure to make and go to all appointments, and call your doctor if you are having problems.  It's also a good idea to know your test results and keep a list of the medicines you take. How can you care for yourself at home? · Get plenty of rest if you feel tired. · Take an over-the-counter pain medicine if needed, such as acetaminophen (Tylenol), ibuprofen (Advil, Motrin), or naproxen (Aleve). Read and follow all instructions on the label. · Be careful when taking over-the-counter cold or flu medicines and Tylenol at the same time. Many of these medicines have acetaminophen, which is Tylenol. Read the labels to make sure that you are not taking more than the recommended dose. Too much acetaminophen (Tylenol) can be harmful. · Drink plenty of fluids. If you have kidney, heart, or liver disease and have to limit fluids, talk with your doctor before you increase the amount of fluids you drink. · Stay home from work, school, and other public places while you have a fever. When should you call for help? Call 911 anytime you think you may need emergency care. For example, call if:    · You have severe trouble breathing.     · You passed out (lost consciousness). Call your doctor now or seek immediate medical care if:    · You seem to be getting much sicker.     · You have a new or higher fever.     · You have blood in your stools.     · You have new belly pain, or your pain gets worse.     · You have a new rash. Watch closely for changes in your health, and be sure to contact your doctor if:    · You start to get better and then get worse.     · You do not get better as expected. Where can you learn more? Go to https://FligoopeLightningBuy.TraNet'te. org and sign in to your [employer] account. Enter G417 in the [employer] box to learn more about \"Viral Infections: Care Instructions. \"     If you do not have an account, please click on the \"Sign Up Now\" link. Current as of: September 23, 2020               Content Version: 12.9  © 5071-0334 Healthwise, Incorporated. Care instructions adapted under license by [facility] ([facility]).  If you have questions about a medical condition or this instruction, always ask your healthcare professional. [address] [address] any warranty or liability for your use of this information. Patient Education        Learning About Coronavirus [phone])  What is coronavirus (COVID-19)? COVID-19 is a disease caused by a new type of coronavirus. This illness was first found in December 2019. It has since spread worldwide. Coronaviruses are a large group of viruses. They cause the common cold. They also cause more serious illnesses like Middle East respiratory syndrome (MERS) and severe acute respiratory syndrome (SARS). COVID-19 is caused by a novel coronavirus. That means it's a new type that has not been seen in people before. What are the symptoms? Coronavirus (COVID-19) symptoms may include:  · Fever. · Cough. · Trouble breathing. · Chills or repeated shaking with chills. · Muscle pain. · Headache. · Sore throat. · New loss of taste or smell. · Vomiting. · Diarrhea. In severe cases, COVID-19 can cause pneumonia and make it hard to breathe without help from a machine. It can cause death. How is it diagnosed? COVID-19 is diagnosed with a viral test. This may also be called a PCR test or antigen test. It looks for evidence of the virus in your breathing passages or lungs (respiratory system). The test is most often done on a sample from the nose, throat, or lungs. It's sometimes done on a sample of saliva. One way a sample is collected is by putting a long swab into the back of your nose. How is it treated? Mild cases of COVID-19 can be treated at home. Serious cases need treatment in the hospital. Treatment may include medicines to reduce symptoms, plus breathing support such as oxygen therapy or a ventilator. Some people may be placed on their belly to help their oxygen levels. Treatments that may help people who have COVID-19 include:  Antiviral medicines.    These medicines treat keyboards. When should you call for help? Call 911 anytime you think you may need emergency care. For example, call if you have life-threatening symptoms, such as:    · You have severe trouble breathing. (You can't talk at all.)     · You have constant chest pain or pressure.     · You are severely dizzy or lightheaded.     · You are confused or can't think clearly.     · Your face and lips have a blue color.     · You pass out (lose consciousness) or are very hard to wake up. Call your doctor now or seek immediate medical care if:    · You have moderate trouble breathing. (You can't speak a full sentence.)     · You are coughing up blood (more than about 1 teaspoon).     · You have signs of low blood pressure. These include feeling lightheaded; being too weak to stand; and having cold, pale, clammy skin. Watch closely for changes in your health, and be sure to contact your doctor if:    · Your symptoms get worse.     · You are not getting better as expected. Call before you go to the doctor's office. Follow their instructions. And wear a cloth face cover. Current as of: March 26, 2021               Content Version: 12.9  © 3981-7997 Healthwise, [employer]. Care instructions adapted under license by [facility] ([facility]). If you have questions about a medical condition or this instruction, always ask your healthcare professional. [address] [address] any warranty or liability for your use of this information. 1. Rest and increase fluid intake. Drink water, Gatorade, and other liquids like broths. 2. Covid-19 testing. Quarantine at home until results are called to you. If positive you will have to quarantine for 14days. If positive the health dept will also contact you. 3. Monitor for fever and treat as needed with tylenol or ibuprofen  4. If patient is not improving or developing any new/worsening symptoms then return to clinic as needed or go to ER.   Patient is to follow up with PCP as needed. 5. If you haven't urinated in 12-16 hours go to ER or you feel that you are unable to tolerate increasing fluid intake go to ER.              Electronically signed by Dorothey Skiff, APRN on 7/29/2021 at 6:24 PM

## 2021-07-30 LAB — SARS-COV-2, PCR: DETECTED

## 2021-09-01 ENCOUNTER — OFFICE VISIT (OUTPATIENT)
Dept: FAMILY MEDICINE CLINIC | Age: 26
End: 2021-09-01
Payer: OTHER GOVERNMENT

## 2021-09-01 VITALS
TEMPERATURE: 97.5 F | OXYGEN SATURATION: 98 % | HEART RATE: 79 BPM | DIASTOLIC BLOOD PRESSURE: 76 MMHG | BODY MASS INDEX: 29.43 KG/M2 | HEIGHT: 68 IN | SYSTOLIC BLOOD PRESSURE: 128 MMHG | WEIGHT: 194.2 LBS

## 2021-09-01 DIAGNOSIS — E53.8 VITAMIN B12 DEFICIENCY: ICD-10-CM

## 2021-09-01 DIAGNOSIS — U07.1 COVID-19: ICD-10-CM

## 2021-09-01 DIAGNOSIS — E55.9 VITAMIN D DEFICIENCY: Primary | ICD-10-CM

## 2021-09-01 DIAGNOSIS — Z23 HIGH PRIORITY FOR 2019-NCOV VACCINE: ICD-10-CM

## 2021-09-01 PROCEDURE — 99214 OFFICE O/P EST MOD 30 MIN: CPT | Performed by: FAMILY MEDICINE

## 2021-09-01 NOTE — PROGRESS NOTES
[facility]  [facility]  [address] [address]  Dept: [phone]  Dept Fax: [phone]  Loc: [phone]    Subjective:     Phoebe Oseguera is a 32 y.o. male who presents today for his medical conditions/complaints as noted below. Phoebe Oseguera is c/o of Follow-up        HPI:   Patient presents for follow-up of vitamin D deficiency, and Covid. Chart review shows that he tested positive for Covid on July 31. He was actually seen on July 29, with sore throat and URI type symptoms. Rapid strep was negative, notes reviewed. Completed quarantine as directed. He is actually being deployed over first, and is hoping to do both Covid vaccines, peripherally Josse Malave, prior to that if he can. However he had received 2 vaccinations on August 28, he thinks one was anthrax is not sure the other one. Advised that typically I recommend waiting at least 2 weeks between vaccinations per CDC guidelines, however given his compress timeline probably reasonable to do first Covid vaccine this weekend, so he can receive the second before being deployed to [place], then eventually [facility]. He is completely asymptomatic now. His vitamin D level was 18 in May. He has been taking 2000 international units of vitamin D over-the-counter daily since mid June, and increased it to 4000 international units while he was under quarantine to treat Covid based on what he read. He is now back to 2000 units daily. He feels significantly better. No other concerns. He does not need any screening paperwork for the [employer] from me. He even quit dipping when he had Covid, and has not restarted.     PHQ Scores 5/3/2021 3/3/2020   PHQ2 Score 1 0   PHQ9 Score 1 0     Interpretation of Total Score Depression Severity: 1-4 = Minimal depression, 5-9 = Mild depression, 10-14 = Moderate depression, 15-19 = Moderately severe depression, 20-27 = Severe depression     OTIS 7 SCORE 5/3/2021 3/3/2020   OTIS-7 Total Score 9 13     Interpretation of OTIS-7 score: 5-9 = mild anxiety, 10-14 = moderate anxiety, 15+ = severe anxiety. Recommend referral to behavioral health for scores 10 or greater. Past Medical History:   Diagnosis Date    Anxiety     Depression     Smokeless tobacco use 3/3/2020    Tobacco Use: Spent 5 minutes discussing smoking cessation, separate of total office visit time documented elsewhere. Discussed health issues attributed to tobacco use. Discussed medication options including nicotine replacement, Wellbutrin, and Chantix. Discussed calling 1-439ShopdecaQUIT-NOW for further assistance. Recommended picking a quit date. Will discuss further at next appointment. No past surgical history on file. Family History   Problem Relation Age of Onset    Depression Mother     Cancer Father     Depression Brother     High Blood Pressure Brother     Depression Maternal Grandmother        Social History     Tobacco Use    Smoking status: Never Smoker    Smokeless tobacco: Former User     Types: Snuff   Substance Use Topics    Alcohol use: No      Current Outpatient Medications   Medication Sig Dispense Refill    vitamin D 25 MCG (1000 UT) CAPS Take 2 capsules by mouth daily       No current facility-administered medications for this visit. Allergies   Allergen Reactions    Azithromycin        Review of Systems   Constitutional: Negative for chills and fever. HENT: Negative for facial swelling and mouth sores. Eyes: Negative for discharge and itching. Respiratory: Negative for apnea and stridor. Cardiovascular: Negative for chest pain and palpitations. Gastrointestinal: Negative for nausea and vomiting. Endocrine: Negative for cold intolerance and heat intolerance. Genitourinary: Negative for frequency and urgency. Musculoskeletal: Negative for arthralgias and back pain. Skin: Negative for color change and rash.          See HPI for visit specific review of symptoms. All others negative      Objective:   /76   Pulse 79   Temp 97.5 °F (36.4 °C)   Ht 5' 8\" (1.727 m)   Wt 194 lb 3.2 oz (88.1 kg)   SpO2 98%   BMI 29.53 kg/m²   Physical Exam  Vitals reviewed. Constitutional:       Appearance: He is well-developed. HENT:      Head: Normocephalic. Eyes:      Conjunctiva/sclera: Conjunctivae normal.      Pupils: Pupils are equal, round, and reactive to light. Cardiovascular:      Rate and Rhythm: Normal rate and regular rhythm. Heart sounds: Normal heart sounds. Pulmonary:      Effort: Pulmonary effort is normal. No respiratory distress. Breath sounds: Normal breath sounds. Abdominal:      General: Bowel sounds are normal. There is no distension. Palpations: Abdomen is soft. Tenderness: There is no abdominal tenderness. Musculoskeletal:         General: Normal range of motion. Cervical back: Normal range of motion and neck supple. Skin:     General: Skin is warm and dry. Capillary Refill: Capillary refill takes less than 2 seconds. Neurological:      Mental Status: He is alert and oriented to person, place, and time. Cranial Nerves: No cranial nerve deficit. Psychiatric:         Behavior: Behavior normal.           Lab Review   No results found for this or any previous visit (from the past 672 hour(s)). Assessment & Plan: The following diagnoses and conditions are stable with no further orders unless indicated:    Patient Active Problem List    Diagnosis Date Noted    Vitamin B12 deficiency 03/03/2020     Overview Note:     Most recent labs were normal.      Vitamin D deficiency 03/03/2020     Overview Note:     Labs reviewed. He is taking 2000 Units daily, advised 800-1000 daily a sufficient but 2000 is also fine. He is going to recheck Vit D level in the next 7-10 days since he has been on this for 3 months.       OTIS (generalized anxiety disorder) 03/03/2020     Overview Note:     Patient

## 2021-09-02 PROBLEM — Z72.0 SMOKELESS TOBACCO USE: Status: RESOLVED | Noted: 2020-03-03 | Resolved: 2021-09-02

## 2021-09-02 ASSESSMENT — ENCOUNTER SYMPTOMS
VOMITING: 0
NAUSEA: 0
APNEA: 0
EYE DISCHARGE: 0
BACK PAIN: 0
EYE ITCHING: 0
FACIAL SWELLING: 0
COLOR CHANGE: 0
STRIDOR: 0

## 2022-06-08 ENCOUNTER — TELEPHONE (OUTPATIENT)
Dept: FAMILY MEDICINE CLINIC | Age: 27
End: 2022-06-08

## 2022-06-08 NOTE — TELEPHONE ENCOUNTER
Called patient to schedule appointment, due to the patient not having a recent appointment. 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

## 2022-11-10 ENCOUNTER — OFFICE VISIT (OUTPATIENT)
Age: 27
End: 2022-11-10
Payer: OTHER GOVERNMENT

## 2022-11-10 VITALS
BODY MASS INDEX: 30.46 KG/M2 | HEART RATE: 72 BPM | WEIGHT: 201 LBS | TEMPERATURE: 96.2 F | HEIGHT: 68 IN | DIASTOLIC BLOOD PRESSURE: 72 MMHG | SYSTOLIC BLOOD PRESSURE: 124 MMHG | OXYGEN SATURATION: 98 %

## 2022-11-10 DIAGNOSIS — H72.91 OTITIS MEDIA, SEROUS, TM RUPTURE, RIGHT: Primary | ICD-10-CM

## 2022-11-10 DIAGNOSIS — H65.91 OTITIS MEDIA, SEROUS, TM RUPTURE, RIGHT: Primary | ICD-10-CM

## 2022-11-10 PROCEDURE — 99213 OFFICE O/P EST LOW 20 MIN: CPT | Performed by: NURSE PRACTITIONER

## 2022-11-10 RX ORDER — CIPROFLOXACIN AND DEXAMETHASONE 3; 1 MG/ML; MG/ML
4 SUSPENSION/ DROPS AURICULAR (OTIC) 2 TIMES DAILY
Qty: 7.5 ML | Refills: 0 | Status: SHIPPED | OUTPATIENT
Start: 2022-11-10 | End: 2022-11-17

## 2022-11-10 ASSESSMENT — ENCOUNTER SYMPTOMS
EYE PAIN: 0
SORE THROAT: 0
ABDOMINAL PAIN: 0
STRIDOR: 0
COUGH: 0
TROUBLE SWALLOWING: 0
ABDOMINAL DISTENTION: 0
SINUS PRESSURE: 0
WHEEZING: 0
COLOR CHANGE: 0
SHORTNESS OF BREATH: 0
CHEST TIGHTNESS: 0
EYE DISCHARGE: 0

## 2022-11-10 NOTE — PATIENT INSTRUCTIONS
Encourage fluids, Tylenol/Ibuprofen, OTC decongestants   Keep Ear clean and dry  Antibiotic drops sent to pharmacy  If symptoms worsen or fail to improve follow-up with office or PCP      Patient verbalized understanding and agrees to plan

## 2022-11-10 NOTE — PROGRESS NOTES
[address]  [address] [address] [place] [address]  Dept: [phone]  Dept Fax: [phone]  Loc: [phone]    Carter Grier is a 32 y.o. male who presents today for his medical conditions/complaints as noted below. Carter Grier is complaining of Otalgia        HPI:   Otalgia   Associated symptoms include ear discharge. Pertinent negatives include no abdominal pain, coughing, neck pain, rash or sore throat. [facility] office today complaining of right ear pressure and drainage. Patient states that symptoms started 2 days ago. States that it started out as a intense pressure in the right ear and yesterday started draining. Denies external tenderness. Past Medical History:   Diagnosis Date    Anxiety     Depression     Smokeless tobacco use 3/3/2020    Tobacco Use: Spent 5 minutes discussing smoking cessation, separate of total office visit time documented elsewhere. Discussed health issues attributed to tobacco use. Discussed medication options including nicotine replacement, Wellbutrin, and Chantix. Discussed calling [phone] for further assistance. Recommended picking a quit date. Will discuss further at next appointment. No past surgical history on file.     Family History   Problem Relation Age of Onset    Depression Mother     Cancer Father     Depression Brother     High Blood Pressure Brother     Depression Maternal Grandmother        Social History     Tobacco Use    Smoking status: Never    Smokeless tobacco: Former     Types: Snuff     Quit date: 7/31/2021   Substance Use Topics    Alcohol use: No        Current Outpatient Medications   Medication Sig Dispense Refill    ciprofloxacin-dexamethasone (CIPRODEX) 0.3-0.1 % otic suspension Place 4 drops into the right ear 2 times daily for 7 days 7.5 mL 0    FLUoxetine (PROZAC) 40 MG capsule       vitamin D 25 MCG (1000 UT) CAPS Take 2 capsules by mouth daily (Patient not taking: Reported on 11/10/2022)       No current facility-administered medications for this visit. Allergies   Allergen Reactions    Azithromycin Headaches and Other (See Comments)     migraine         Health Maintenance   Topic Date Due    COVID-19 Vaccine (4 - Booster for Pfizer series) 12/07/2021    Depression Monitoring  05/03/2022    Flu vaccine (1) 08/01/2022    DTaP/Tdap/Td vaccine (3 - Td or Tdap) 01/01/2026    Hepatitis A vaccine  Completed    Meningococcal (ACWY) vaccine  Completed    HIV screen  Completed    Hib vaccine  Aged Out    Pneumococcal 0-64 years Vaccine  Aged Out    Varicella vaccine  Discontinued    Hepatitis C screen  Discontinued       Subjective:   Review of Systems   Constitutional:  Negative for chills, fatigue and fever. HENT:  Positive for ear discharge and ear pain. Negative for congestion, sinus pressure, sore throat and trouble swallowing. Eyes:  Negative for pain and discharge. Respiratory:  Negative for cough, chest tightness, shortness of breath, wheezing and stridor. Cardiovascular:  Negative for chest pain and palpitations. Gastrointestinal:  Negative for abdominal distention and abdominal pain. Genitourinary:  Negative for difficulty urinating, dysuria and hematuria. Musculoskeletal:  Negative for arthralgias, neck pain and neck stiffness. Skin:  Negative for color change and rash. Neurological:  Negative for dizziness, syncope, speech difficulty, weakness and numbness. Psychiatric/Behavioral:  Negative for confusion and suicidal ideas. Objective    Physical Exam  Vitals and nursing note reviewed. Constitutional:       General: He is not in acute distress. Appearance: Normal appearance. HENT:      Head: Normocephalic.       Right Ear: External ear normal.      Left Ear: Tympanic membrane, ear canal and external ear normal.      Ears:      Comments: Right TM rupture with yellow-cream colored drainage in canal.  Negative for tragal tenderness. Nose: Nose normal. No congestion. Mouth/Throat:      Mouth: Mucous membranes are moist.      Pharynx: Oropharynx is clear. No posterior oropharyngeal erythema. Eyes:      Conjunctiva/sclera: Conjunctivae normal.      Pupils: Pupils are equal, round, and reactive to light. Cardiovascular:      Rate and Rhythm: Normal rate and regular rhythm. Pulses: Normal pulses. Heart sounds: Normal heart sounds. No murmur heard. Pulmonary:      Effort: Pulmonary effort is normal. No respiratory distress. Breath sounds: Normal breath sounds. No stridor. No wheezing. Abdominal:      General: Abdomen is flat. Bowel sounds are normal. There is no distension. Tenderness: There is no abdominal tenderness. Musculoskeletal:         General: No swelling or deformity. Normal range of motion. Cervical back: Normal range of motion. No rigidity or tenderness. Skin:     General: Skin is warm and dry. Findings: No rash. Neurological:      General: No focal deficit present. Mental Status: He is alert and oriented to person, place, and time. Sensory: No sensory deficit. /72   Pulse 72   Temp (!) 96.2 °F (35.7 °C)   Ht 5' 8\" (1.727 m)   Wt 201 lb (91.2 kg)   SpO2 98%   BMI 30.56 kg/m²     Assessment         Diagnosis Orders   1. Otitis media, serous, tm rupture, right  ciprofloxacin-dexamethasone (CIPRODEX) 0.3-0.1 % otic suspension          Plan   Encourage fluids, Tylenol/Ibuprofen, OTC decongestants   Keep Ear clean and dry  Antibiotic drops sent to pharmacy  If symptoms worsen or fail to improve follow-up with office or PCP      Patient verbalized understanding and agrees to plan   No orders of the defined types were placed in this encounter. No results found for this visit on 11/10/22.     Orders Placed This Encounter   Medications    ciprofloxacin-dexamethasone (CIPRODEX) 0.3-0.1 % otic suspension     Sig: Place 4 drops into the right ear 2 times daily for 7 days     Dispense:  7.5 mL     Refill:  0      New Prescriptions    CIPROFLOXACIN-DEXAMETHASONE (CIPRODEX) 0.3-0.1 % OTIC SUSPENSION    Place 4 drops into the right ear 2 times daily for 7 days        No follow-ups on file. Discussed use, benefits, and side effects of any prescribed medications. All patient questions were answered. Patient voiced understanding of care plan. Patient was given educational materials - see patient instructions below.      Patient Instructions   Encourage fluids, Tylenol/Ibuprofen, OTC decongestants   Keep Ear clean and dry  Antibiotic drops sent to pharmacy  If symptoms worsen or fail to improve follow-up with office or PCP      Patient verbalized understanding and agrees to plan       Electronically signed by RHIANNA Rider CNP on 11/10/2022 at 10:36 AM

## 2022-11-15 ENCOUNTER — OFFICE VISIT (OUTPATIENT)
Dept: FAMILY MEDICINE CLINIC | Age: 27
End: 2022-11-15
Payer: OTHER GOVERNMENT

## 2022-11-15 VITALS
SYSTOLIC BLOOD PRESSURE: 100 MMHG | HEIGHT: 68 IN | BODY MASS INDEX: 30.71 KG/M2 | HEART RATE: 87 BPM | WEIGHT: 202.6 LBS | TEMPERATURE: 97.5 F | OXYGEN SATURATION: 98 % | DIASTOLIC BLOOD PRESSURE: 60 MMHG

## 2022-11-15 DIAGNOSIS — H65.01 NON-RECURRENT ACUTE SEROUS OTITIS MEDIA OF RIGHT EAR: ICD-10-CM

## 2022-11-15 DIAGNOSIS — H69.83 EUSTACHIAN TUBE DYSFUNCTION, BILATERAL: Primary | ICD-10-CM

## 2022-11-15 PROCEDURE — 99213 OFFICE O/P EST LOW 20 MIN: CPT | Performed by: NURSE PRACTITIONER

## 2022-11-15 RX ORDER — FLUTICASONE PROPIONATE 50 MCG
2 SPRAY, SUSPENSION (ML) NASAL DAILY
Qty: 16 G | Refills: 0 | Status: SHIPPED | OUTPATIENT
Start: 2022-11-15

## 2022-11-15 RX ORDER — METHYLPREDNISOLONE 4 MG/1
TABLET ORAL
Qty: 1 KIT | Refills: 0 | Status: SHIPPED | OUTPATIENT
Start: 2022-11-15 | End: 2022-11-21

## 2022-11-15 RX ORDER — AMOXICILLIN 500 MG/1
500 CAPSULE ORAL 2 TIMES DAILY
Qty: 20 CAPSULE | Refills: 0 | Status: SHIPPED | OUTPATIENT
Start: 2022-11-15 | End: 2022-11-25

## 2022-11-15 ASSESSMENT — ENCOUNTER SYMPTOMS
ALLERGIC/IMMUNOLOGIC NEGATIVE: 1
GASTROINTESTINAL NEGATIVE: 1
EYES NEGATIVE: 1
RESPIRATORY NEGATIVE: 1

## 2022-11-15 ASSESSMENT — PATIENT HEALTH QUESTIONNAIRE - PHQ9
6. FEELING BAD ABOUT YOURSELF - OR THAT YOU ARE A FAILURE OR HAVE LET YOURSELF OR YOUR FAMILY DOWN: 0
SUM OF ALL RESPONSES TO PHQ QUESTIONS 1-9: 0
SUM OF ALL RESPONSES TO PHQ QUESTIONS 1-9: 0
8. MOVING OR SPEAKING SO SLOWLY THAT OTHER PEOPLE COULD HAVE NOTICED. OR THE OPPOSITE, BEING SO FIGETY OR RESTLESS THAT YOU HAVE BEEN MOVING AROUND A LOT MORE THAN USUAL: 0
SUM OF ALL RESPONSES TO PHQ QUESTIONS 1-9: 0
7. TROUBLE CONCENTRATING ON THINGS, SUCH AS READING THE NEWSPAPER OR WATCHING TELEVISION: 0
3. TROUBLE FALLING OR STAYING ASLEEP: 0
4. FEELING TIRED OR HAVING LITTLE ENERGY: 0
9. THOUGHTS THAT YOU WOULD BE BETTER OFF DEAD, OR OF HURTING YOURSELF: 0
2. FEELING DOWN, DEPRESSED OR HOPELESS: 0
10. IF YOU CHECKED OFF ANY PROBLEMS, HOW DIFFICULT HAVE THESE PROBLEMS MADE IT FOR YOU TO DO YOUR WORK, TAKE CARE OF THINGS AT HOME, OR GET ALONG WITH OTHER PEOPLE: 0
5. POOR APPETITE OR OVEREATING: 0
SUM OF ALL RESPONSES TO PHQ QUESTIONS 1-9: 0
1. LITTLE INTEREST OR PLEASURE IN DOING THINGS: 0
SUM OF ALL RESPONSES TO PHQ9 QUESTIONS 1 & 2: 0

## 2022-11-15 NOTE — PROGRESS NOTES
[facility]  [facility]  [address] [address]  [facility] [place] [address]  Dept: [phone]  Dept Fax: [phone]  Loc: [phone]    Dane Parks is a 32 y.o. male who presents today for his medical conditions/complaints as noted below. Dane Parks is c/o of Otalgia (Ringing noise, feeling stopped up.)        HPI:   He is a patient of Dr. Blank Hernandez that presents with right ear pain. He states the ears continues to ringing noise and feels muffled. He denies any ear drainage. He admits to having runny nose, congestion prior to the ear issues. States he was prescribed an ear drop for his ear pain. HPI   Chief Complaint   Patient presents with    Otalgia     Ringing noise, feeling stopped up. Past Medical History:   Diagnosis Date    Anxiety     Depression     Smokeless tobacco use 3/3/2020    Tobacco Use: Spent 5 minutes discussing smoking cessation, separate of total office visit time documented elsewhere. Discussed health issues attributed to tobacco use. Discussed medication options including nicotine replacement, Wellbutrin, and Chantix. Discussed calling [phone] for further assistance. Recommended picking a quit date. Will discuss further at next appointment. No past surgical history on file. Vitals 11/15/2022 11/10/2022 9/1/2021 7/29/2021 7/28/2021 7/4/4086   SYSTOLIC 955 552 259 337 111 853   DIASTOLIC 60 72 76 73 68 68   Pulse 87 72 79 120 79 65   Temp 97.5 96.2 97.5 99.4 97.4 96.9   Resp - - - 20 - -   SpO2 98 98 98 97 98 99   Weight 202 lb 9.6 oz 201 lb 194 lb 3.2 oz 186 lb 188 lb 182 lb   Height 5' 8\" 5' 8\" 5' 8\" 5' 8\" 5' 8\" 5' 9\"   Body mass index 30.8 kg/m2 30.56 kg/m2 29.52 kg/m2 28.28 kg/m2 28.58 kg/m2 26.87 kg/m2   Some encounter information is confidential and restricted. Go to Review Flowsheets activity to see all data.    Some recent data might be hidden       Family History   Problem Relation Age of Onset    Depression Mother     Cancer Father     Depression Brother     High Blood Pressure Brother     Depression Maternal Grandmother        Social History     Tobacco Use    Smoking status: Never    Smokeless tobacco: Former     Types: Snuff     Quit date: 7/31/2021   Substance Use Topics    Alcohol use: No      Current Outpatient Medications on File Prior to Visit   Medication Sig Dispense Refill    ciprofloxacin-dexamethasone (CIPRODEX) 0.3-0.1 % otic suspension Place 4 drops into the right ear 2 times daily for 7 days 7.5 mL 0    FLUoxetine (PROZAC) 40 MG capsule        No current facility-administered medications on file prior to visit. Allergies   Allergen Reactions    Azithromycin Headaches and Other (See Comments)     migraine         Health Maintenance   Topic Date Due    COVID-19 Vaccine (4 - Booster for Pfizer series) 12/07/2021    Depression Monitoring  05/03/2022    Flu vaccine (1) 08/01/2022    DTaP/Tdap/Td vaccine (3 - Td or Tdap) 01/01/2026    Hepatitis A vaccine  Completed    Meningococcal (ACWY) vaccine  Completed    HIV screen  Completed    Hib vaccine  Aged Out    Pneumococcal 0-64 years Vaccine  Aged Out    Varicella vaccine  Discontinued    Hepatitis C screen  Discontinued       Subjective   SUBJECTIVE/OBJECTIVE:  @HPI@    Review of Systems   Constitutional: Negative. HENT:  Positive for ear pain and tinnitus (right). Eyes: Negative. Respiratory: Negative. Cardiovascular: Negative. Gastrointestinal: Negative. Endocrine: Negative. Genitourinary: Negative. Musculoskeletal: Negative. Skin: Negative. Allergic/Immunologic: Negative. Neurological: Negative. Hematological: Negative. Psychiatric/Behavioral: Negative. Objective   Physical Exam  Vitals and nursing note reviewed. Constitutional:       Appearance: Normal appearance. HENT:      Head: Normocephalic. Right Ear: A middle ear effusion is present. Tympanic membrane is erythematous.       Left Ear: A middle ear effusion is present. Tympanic membrane is erythematous. Nose: Nose normal.      Mouth/Throat:      Mouth: Mucous membranes are moist.      Pharynx: Oropharynx is clear. No oropharyngeal exudate or posterior oropharyngeal erythema. Eyes:      General:         Right eye: No discharge. Left eye: No discharge. Cardiovascular:      Rate and Rhythm: Normal rate and regular rhythm. Pulses: Normal pulses. Heart sounds: Normal heart sounds. Pulmonary:      Effort: Pulmonary effort is normal.      Breath sounds: Normal breath sounds. No wheezing or rhonchi. Abdominal:      General: Abdomen is flat. Bowel sounds are normal.      Palpations: Abdomen is soft. Tenderness: There is no abdominal tenderness. Musculoskeletal:         General: Normal range of motion. Cervical back: Normal range of motion. Skin:     General: Skin is warm and dry. Neurological:      Mental Status: He is alert and oriented to person, place, and time. Psychiatric:         Mood and Affect: Mood normal.         Behavior: Behavior normal.         Thought Content: Thought content normal.         Judgment: Judgment normal.          ASSESSMENT/PLAN:  1. Eustachian tube dysfunction, bilateral  2. Non-recurrent acute serous otitis media of right ear    Return in about 2 weeks (around 11/29/2022) for follow up with PCP. More than 50% of the time was spent counseling and coordinating care for a total time of 15-20 min face to face. Start Amoxicillin 500mg bid x 10 days, Medrol dose pack, Flonase  Follow up in 2 weeks.   PDMP Monitoring:    Last PDMP Nancy Hathaway as Reviewed:  Review User Review Instant Review Result            Urine Drug Screenings (1 yr)       Urine Drug Screen  Collected: 8/15/2018 10:23 PM (Final result)                  Medication Contract and Consent for Opioid Use Documents Filed        No documents found                     Patient given educational materials -see patient instructions. Discussed use, benefit, and side effects of prescribed medications. All patient questions answered. Pt voiced understanding. Reviewed health maintenance. Instructed to continue currentmedications, diet and exercise. Patient agreed with treatment plan. Follow up as directed. MEDICATIONS:  Orders Placed This Encounter   Medications    amoxicillin (AMOXIL) 500 MG capsule     Sig: Take 1 capsule by mouth 2 times daily for 10 days     Dispense:  20 capsule     Refill:  0    fluticasone (FLONASE) 50 MCG/ACT nasal spray     Si sprays by Each Nostril route daily     Dispense:  16 g     Refill:  0    methylPREDNISolone (MEDROL DOSEPACK) 4 MG tablet     Sig: Take by mouth. Dispense:  1 kit     Refill:  0         ORDERS:  No orders of the defined types were placed in this encounter. Follow-up:  Return in about 2 weeks (around 2022) for follow up with PCP. PATIENT INSTRUCTIONS:  There are no Patient Instructions on file for this visit. Electronically signed by RHIANNA Gomez on 11/15/2022 at 2:00 PM    EMR Dragon/transcription disclaimer:  Much of thisencounter note is electronic transcription/translation of spoken language to printed texts. The electronic translation of spoken language may be erroneous, or at times, nonsensical words or phrases may be inadvertentlytranscribed.   Although I have reviewed the note for such errors, some may still exist.

## 2023-01-01 ENCOUNTER — OFFICE VISIT (OUTPATIENT)
Age: 28
End: 2023-01-01

## 2023-01-01 VITALS
HEIGHT: 68 IN | HEART RATE: 87 BPM | TEMPERATURE: 97.8 F | OXYGEN SATURATION: 98 % | BODY MASS INDEX: 30.58 KG/M2 | DIASTOLIC BLOOD PRESSURE: 68 MMHG | WEIGHT: 201.8 LBS | SYSTOLIC BLOOD PRESSURE: 134 MMHG

## 2023-01-01 DIAGNOSIS — J06.9 VIRAL URI WITH COUGH: Primary | ICD-10-CM

## 2023-01-01 DIAGNOSIS — R06.2 WHEEZING: ICD-10-CM

## 2023-01-01 DIAGNOSIS — J02.9 PHARYNGITIS, UNSPECIFIED ETIOLOGY: ICD-10-CM

## 2023-01-01 DIAGNOSIS — R05.9 COUGH, UNSPECIFIED TYPE: ICD-10-CM

## 2023-01-01 LAB
INFLUENZA A ANTIBODY: NORMAL
INFLUENZA B ANTIBODY: NORMAL
S PYO AG THROAT QL: NORMAL

## 2023-01-01 RX ORDER — BROMPHENIRAMINE MALEATE, PSEUDOEPHEDRINE HYDROCHLORIDE, AND DEXTROMETHORPHAN HYDROBROMIDE 2; 30; 10 MG/5ML; MG/5ML; MG/5ML
5-10 SYRUP ORAL 4 TIMES DAILY PRN
Qty: 118 ML | Refills: 0 | Status: SHIPPED | OUTPATIENT
Start: 2023-01-01

## 2023-01-01 RX ORDER — DEXAMETHASONE SODIUM PHOSPHATE 10 MG/ML
10 INJECTION INTRAMUSCULAR; INTRAVENOUS ONCE
Status: COMPLETED | OUTPATIENT
Start: 2023-01-01 | End: 2023-01-01

## 2023-01-01 RX ORDER — ALBUTEROL SULFATE 90 UG/1
2 AEROSOL, METERED RESPIRATORY (INHALATION) EVERY 6 HOURS PRN
Qty: 18 G | Refills: 0 | Status: SHIPPED | OUTPATIENT
Start: 2023-01-01

## 2023-01-01 RX ADMIN — DEXAMETHASONE SODIUM PHOSPHATE 10 MG: 10 INJECTION INTRAMUSCULAR; INTRAVENOUS at 12:56

## 2023-01-01 ASSESSMENT — ENCOUNTER SYMPTOMS
SORE THROAT: 1
SHORTNESS OF BREATH: 1
COUGH: 1

## 2023-01-01 NOTE — PATIENT INSTRUCTIONS
1. Albuterol inhaler as needed for wheezing and shortness of breath  2. Rest  3. Hydrate with water or gatorade  4. Avoid OTC decongestants - use bromfed as needed  5. Tylenol or motrin for pain or fever  6. Warm salt water gargles or warm liquids for comfort. Warm tea with a tablespoon of honey is excellent for sore throat. 7. Cool mist humidifer   8.  If symptoms worsen, please seek reevaluation

## 2023-01-01 NOTE — PROGRESS NOTES
[address]  [address] [address] [place] [address]  Dept: [phone]  Dept Fax: [phone]  Loc: [phone]    Caroline Farris is a 32 y.o. male who presents today for his medical conditions/complaints as noted below. Caroline Farris is c/o of Cough, Congestion, Fever, Dizziness, and Pharyngitis        HPI:     HPI  Caroline Farris presents with complaints of cough, congestion, fatigue, dizziness, body aches and sore throat. Unsure if has had fever. All of his family has similar symptoms. Symptoms began 1.5 days ago. OTC treatment includes mucinex and cold and flu medicine with short term relief. Denies recent antibiotics and steroids. Denies recent covid19 infection. Vaccinated against [id number]. Past Medical History:   Diagnosis Date    Anxiety     Depression     Smokeless tobacco use 3/3/2020    Tobacco Use: Spent 5 minutes discussing smoking cessation, separate of total office visit time documented elsewhere. Discussed health issues attributed to tobacco use. Discussed medication options including nicotine replacement, Wellbutrin, and Chantix. Discussed calling [phone] for further assistance. Recommended picking a quit date. Will discuss further at next appointment. No past surgical history on file.     Family History   Problem Relation Age of Onset    Depression Mother     Cancer Father     Depression Brother     High Blood Pressure Brother     Depression Maternal Grandmother        Social History     Tobacco Use    Smoking status: Never    Smokeless tobacco: Former     Types: Snuff     Quit date: 7/31/2021   Substance Use Topics    Alcohol use: No      Current Outpatient Medications   Medication Sig Dispense Refill    brompheniramine-pseudoephedrine-DM 2-30-10 MG/5ML syrup Take 5-10 mLs by mouth 4 times daily as needed for Congestion or Cough 118 mL 0    albuterol sulfate HFA (VENTOLIN HFA) 108 (90 Base) MCG/ACT inhaler Inhale 2 puffs into the lungs every 6 hours as needed for Wheezing or Shortness of Breath 18 g 0    fluticasone (FLONASE) 50 MCG/ACT nasal spray 2 sprays by Each Nostril route daily 16 g 0    FLUoxetine (PROZAC) 40 MG capsule        Current Facility-Administered Medications   Medication Dose Route Frequency Provider Last Rate Last Admin    dexamethasone (DECADRON) injection 10 mg  10 mg IntraMUSCular Once [employer] RHIANNA Ornelas CNP         Allergies   Allergen Reactions    Azithromycin Headaches and Other (See Comments)     migraine         Health Maintenance   Topic Date Due    COVID-19 Vaccine (4 - Booster for Pfizer series) 12/07/2021    Flu vaccine (1) 08/01/2022    Depression Monitoring  11/15/2023    DTaP/Tdap/Td vaccine (8 - Td or Tdap) 01/01/2026    Hepatitis A vaccine  Completed    Hib vaccine  Completed    Meningococcal (ACWY) vaccine  Completed    HIV screen  Completed    Pneumococcal 0-64 years Vaccine  Aged Out    Varicella vaccine  Discontinued    Hepatitis C screen  Discontinued       Subjective:     Review of Systems   Constitutional:  Positive for fatigue. HENT:  Positive for congestion and sore throat. Respiratory:  Positive for cough and shortness of breath. Musculoskeletal:  Positive for myalgias. Neurological:  Positive for dizziness.     :Objective      Physical Exam  Constitutional:       General: He is not in acute distress. Appearance: Normal appearance. He is ill-appearing. He is not toxic-appearing. HENT:      Head: Normocephalic and atraumatic. Right Ear: Ear canal and external ear normal. Tympanic membrane is erythematous (dull). Left Ear: Ear canal and external ear normal. Tympanic membrane is erythematous. Nose: Congestion present. Mouth/Throat:      Mouth: Mucous membranes are moist.      Pharynx: Oropharynx is clear. Posterior oropharyngeal erythema present. No oropharyngeal exudate. Eyes:      General:         Right eye: No discharge. Left eye: No discharge. Conjunctiva/sclera: Conjunctivae normal.   Cardiovascular:      Rate and Rhythm: Normal rate and regular rhythm. Pulmonary:      Effort: Pulmonary effort is normal. No respiratory distress. Breath sounds: Examination of the right-upper field reveals wheezing. Examination of the left-upper field reveals wheezing. Wheezing present. Abdominal:      General: Abdomen is flat. Palpations: Abdomen is soft. Musculoskeletal:         General: Normal range of motion. Cervical back: Normal range of motion. Lymphadenopathy:      Cervical: No cervical adenopathy. Skin:     General: Skin is warm and dry. Capillary Refill: Capillary refill takes less than 2 seconds. Findings: No rash. Neurological:      General: No focal deficit present. Mental Status: He is alert. Psychiatric:         Mood and Affect: Mood normal.     /68   Pulse 87   Temp 97.8 °F (36.6 °C) (Temporal)   Ht 5' 8\" (1.727 m)   Wt 201 lb 12.8 oz (91.5 kg)   SpO2 98%   BMI 30.68 kg/m²     :Assessment       Diagnosis Orders   1. Viral URI with cough  brompheniramine-pseudoephedrine-DM 2-30-10 MG/5ML syrup    albuterol sulfate HFA (VENTOLIN HFA) 108 (90 Base) MCG/ACT inhaler    dexamethasone (DECADRON) injection 10 mg      2. Wheezing        3. Cough, unspecified type  POCT Influenza A/B      4. Pharyngitis, unspecified etiology  POCT rapid strep A          :Plan   Steroid injection and albuterol for wheezing. Bromfed for symptomatic support. Likely viral URI. Negative home covid test. Flu and strep negative in office. Return precautions and home care education completed. Patient verbalized understanding.      Orders Placed This Encounter   Procedures    POCT Influenza A/B    POCT rapid strep A     Results for orders placed or performed in visit on 01/01/23   POCT Influenza A/B   Result Value Ref Range    Influenza A Ab Neg     Influenza B Ab Neg    POCT rapid strep A   Result Value Ref Range    Strep A Ag None Detected None Detected       No follow-ups on file. Orders Placed This Encounter   Medications    brompheniramine-pseudoephedrine-DM 2-30-10 MG/5ML syrup     Sig: Take 5-10 mLs by mouth 4 times daily as needed for Congestion or Cough     Dispense:  118 mL     Refill:  0    albuterol sulfate HFA (VENTOLIN HFA) 108 (90 Base) MCG/ACT inhaler     Sig: Inhale 2 puffs into the lungs every 6 hours as needed for Wheezing or Shortness of Breath     Dispense:  18 g     Refill:  0    dexamethasone (DECADRON) injection 10 mg       Patient given educational materials- see patient instructions. Discussed use, benefit, and side effects of prescribed medications. All patient questions answered. Pt voiced understanding. There are no Patient Instructions on file for this visit.       Electronically signed by RHIANNA Ling CNP on 1/1/2023 at 12:50 PM

## 2023-03-08 ENCOUNTER — OFFICE VISIT (OUTPATIENT)
Dept: PRIMARY CARE CLINIC | Age: 28
End: 2023-03-08

## 2023-03-08 VITALS
TEMPERATURE: 97.8 F | DIASTOLIC BLOOD PRESSURE: 70 MMHG | WEIGHT: 194.6 LBS | BODY MASS INDEX: 28.82 KG/M2 | OXYGEN SATURATION: 98 % | HEIGHT: 69 IN | HEART RATE: 88 BPM | SYSTOLIC BLOOD PRESSURE: 130 MMHG

## 2023-03-08 DIAGNOSIS — E55.9 VITAMIN D DEFICIENCY: ICD-10-CM

## 2023-03-08 DIAGNOSIS — R53.83 OTHER FATIGUE: ICD-10-CM

## 2023-03-08 DIAGNOSIS — Z00.00 ANNUAL PHYSICAL EXAM: Primary | ICD-10-CM

## 2023-03-08 DIAGNOSIS — F33.0 MDD (MAJOR DEPRESSIVE DISORDER), RECURRENT EPISODE, MILD (HCC): ICD-10-CM

## 2023-03-08 DIAGNOSIS — F41.1 GAD (GENERALIZED ANXIETY DISORDER): ICD-10-CM

## 2023-03-08 DIAGNOSIS — K21.9 GASTROESOPHAGEAL REFLUX DISEASE, UNSPECIFIED WHETHER ESOPHAGITIS PRESENT: ICD-10-CM

## 2023-03-08 DIAGNOSIS — Z00.00 ANNUAL PHYSICAL EXAM: ICD-10-CM

## 2023-03-08 LAB
ALBUMIN SERPL-MCNC: 4.6 G/DL (ref 3.5–5.2)
ALP BLD-CCNC: 72 U/L (ref 40–130)
ALT SERPL-CCNC: 14 U/L (ref 5–41)
ANION GAP SERPL CALCULATED.3IONS-SCNC: 10 MMOL/L (ref 7–19)
AST SERPL-CCNC: 15 U/L (ref 5–40)
BASOPHILS ABSOLUTE: 0.1 K/UL (ref 0–0.2)
BASOPHILS RELATIVE PERCENT: 1.1 % (ref 0–1)
BILIRUB SERPL-MCNC: 0.5 MG/DL (ref 0.2–1.2)
BUN BLDV-MCNC: 16 MG/DL (ref 6–20)
CALCIUM SERPL-MCNC: 9.9 MG/DL (ref 8.6–10)
CHLORIDE BLD-SCNC: 103 MMOL/L (ref 98–111)
CO2: 27 MMOL/L (ref 22–29)
CREAT SERPL-MCNC: 1.2 MG/DL (ref 0.5–1.2)
EOSINOPHILS ABSOLUTE: 0.2 K/UL (ref 0–0.6)
EOSINOPHILS RELATIVE PERCENT: 2.1 % (ref 0–5)
GFR SERPL CREATININE-BSD FRML MDRD: >60 ML/MIN/{1.73_M2}
GLUCOSE BLD-MCNC: 88 MG/DL (ref 74–109)
HCT VFR BLD CALC: 45 % (ref 42–52)
HEMOGLOBIN: 15.5 G/DL (ref 14–18)
IMMATURE GRANULOCYTES #: 0 K/UL
LYMPHOCYTES ABSOLUTE: 2.7 K/UL (ref 1.1–4.5)
LYMPHOCYTES RELATIVE PERCENT: 36.3 % (ref 20–40)
MCH RBC QN AUTO: 30.2 PG (ref 27–31)
MCHC RBC AUTO-ENTMCNC: 34.4 G/DL (ref 33–37)
MCV RBC AUTO: 87.7 FL (ref 80–94)
MONOCYTES ABSOLUTE: 0.5 K/UL (ref 0–0.9)
MONOCYTES RELATIVE PERCENT: 6.9 % (ref 0–10)
NEUTROPHILS ABSOLUTE: 4 K/UL (ref 1.5–7.5)
NEUTROPHILS RELATIVE PERCENT: 53.5 % (ref 50–65)
PDW BLD-RTO: 12.2 % (ref 11.5–14.5)
PLATELET # BLD: 246 K/UL (ref 130–400)
PMV BLD AUTO: 11.2 FL (ref 9.4–12.4)
POTASSIUM SERPL-SCNC: 4.3 MMOL/L (ref 3.5–5)
RBC # BLD: 5.13 M/UL (ref 4.7–6.1)
SODIUM BLD-SCNC: 140 MMOL/L (ref 136–145)
T4 FREE: 1.22 NG/DL (ref 0.93–1.7)
TOTAL PROTEIN: 7.3 G/DL (ref 6.6–8.7)
TSH SERPL DL<=0.05 MIU/L-ACNC: 2.65 UIU/ML (ref 0.27–4.2)
VITAMIN D 25-HYDROXY: 15.6 NG/ML
WBC # BLD: 7.6 K/UL (ref 4.8–10.8)

## 2023-03-08 RX ORDER — FLUOXETINE HYDROCHLORIDE 40 MG/1
40 CAPSULE ORAL DAILY
Qty: 90 CAPSULE | Refills: 0 | Status: SHIPPED | OUTPATIENT
Start: 2023-03-08

## 2023-03-08 RX ORDER — OMEPRAZOLE 20 MG/1
20 CAPSULE, DELAYED RELEASE ORAL
Qty: 90 CAPSULE | Refills: 0 | Status: SHIPPED | OUTPATIENT
Start: 2023-03-08

## 2023-03-08 SDOH — ECONOMIC STABILITY: INCOME INSECURITY: HOW HARD IS IT FOR YOU TO PAY FOR THE VERY BASICS LIKE FOOD, HOUSING, MEDICAL CARE, AND HEATING?: NOT VERY HARD

## 2023-03-08 SDOH — ECONOMIC STABILITY: FOOD INSECURITY: WITHIN THE PAST 12 MONTHS, THE FOOD YOU BOUGHT JUST DIDN'T LAST AND YOU DIDN'T HAVE MONEY TO GET MORE.: SOMETIMES TRUE

## 2023-03-08 SDOH — ECONOMIC STABILITY: HOUSING INSECURITY
IN THE LAST 12 MONTHS, WAS THERE A TIME WHEN YOU DID NOT HAVE A STEADY PLACE TO SLEEP OR SLEPT IN A SHELTER (INCLUDING NOW)?: NO

## 2023-03-08 SDOH — ECONOMIC STABILITY: FOOD INSECURITY: WITHIN THE PAST 12 MONTHS, YOU WORRIED THAT YOUR FOOD WOULD RUN OUT BEFORE YOU GOT MONEY TO BUY MORE.: OFTEN TRUE

## 2023-03-08 ASSESSMENT — PATIENT HEALTH QUESTIONNAIRE - PHQ9
5. POOR APPETITE OR OVEREATING: 0
7. TROUBLE CONCENTRATING ON THINGS, SUCH AS READING THE NEWSPAPER OR WATCHING TELEVISION: 0
SUM OF ALL RESPONSES TO PHQ QUESTIONS 1-9: 8
2. FEELING DOWN, DEPRESSED OR HOPELESS: 1
9. THOUGHTS THAT YOU WOULD BE BETTER OFF DEAD, OR OF HURTING YOURSELF: 0
8. MOVING OR SPEAKING SO SLOWLY THAT OTHER PEOPLE COULD HAVE NOTICED. OR THE OPPOSITE, BEING SO FIGETY OR RESTLESS THAT YOU HAVE BEEN MOVING AROUND A LOT MORE THAN USUAL: 0
SUM OF ALL RESPONSES TO PHQ QUESTIONS 1-9: 8
4. FEELING TIRED OR HAVING LITTLE ENERGY: 3
SUM OF ALL RESPONSES TO PHQ9 QUESTIONS 1 & 2: 2
6. FEELING BAD ABOUT YOURSELF - OR THAT YOU ARE A FAILURE OR HAVE LET YOURSELF OR YOUR FAMILY DOWN: 0
SUM OF ALL RESPONSES TO PHQ QUESTIONS 1-9: 8
1. LITTLE INTEREST OR PLEASURE IN DOING THINGS: 1
10. IF YOU CHECKED OFF ANY PROBLEMS, HOW DIFFICULT HAVE THESE PROBLEMS MADE IT FOR YOU TO DO YOUR WORK, TAKE CARE OF THINGS AT HOME, OR GET ALONG WITH OTHER PEOPLE: 3
SUM OF ALL RESPONSES TO PHQ QUESTIONS 1-9: 8
3. TROUBLE FALLING OR STAYING ASLEEP: 3

## 2023-03-08 NOTE — PATIENT INSTRUCTIONS
Go to room 405 for labs prior to next visit. Be sure to fast for at least 12 hours prior to laboratory appointment, unless otherwise instructed by me. Would recommend getting labs about 1 week prior to the appointment time to ensure they are available at the time of the appointment. No appointment is necessary for laboratory work as the orders have already been placed.     Lab opens at 6:30 am and closes at 5:00 pm.

## 2023-03-08 NOTE — PROGRESS NOTES
[address] [facility]  [address] [address]  [address] [address]  Dept: [phone]  Dept Fax: [phone]  Loc: [phone]    Subjective:     Felton Briseno is a 32 y.o. male who presents today for his medical conditions/complaints as noted below. Felton Briseno is c/o of 1 Year Follow Up (Patient states he would like to talk medications)        HPI:   Patient presents for annual physical, follow-up of anxiety and depression. I last saw him in 2021. 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 He may be going abroad next year, but is not sure. His depression screen is elevated today. He does like the Prozac's, would like to continue 40 mg, does need refill. He also has been having severe GERD. Takes over-the-counter Prilosec 20 mg occasionally, does help when he takes it. He would like a prescription for the same. \He does have fatigue which he feels is out of proportion for his activity. He would like to do labs for the same. No other concerns. PHQ Scores 3/8/2023 11/15/2022 5/3/2021 3/3/2020   PHQ2 Score 2 0 1 0   PHQ9 Score 8 0 1 0     Interpretation of Total Score Depression Severity: 1-4 = Minimal depression, 5-9 = Mild depression, 10-14 = Moderate depression, 15-19 = Moderately severe depression, 20-27 = Severe depression     OTIS 7 SCORE 5/3/2021 3/3/2020   OTIS-7 Total Score 9 13     Interpretation of OTIS-7 score: 5-9 = mild anxiety, 10-14 = moderate anxiety, 15+ = severe anxiety. Recommend referral to behavioral health for scores 10 or greater. Past Medical History:   Diagnosis Date    Anxiety     Depression     Smokeless tobacco use 3/3/2020    Tobacco Use: Spent 5 minutes discussing smoking cessation, separate of total office visit time documented elsewhere. Discussed health issues attributed to tobacco use. Discussed medication options including nicotine replacement, Wellbutrin, and Chantix.   Discussed calling 1-800-QUIT-NOW for further assistance. Recommended picking a quit date. Will discuss further at next appointment. No past surgical history on file. Family History   Problem Relation Age of Onset    Depression Mother     Cancer Father     Depression Brother     High Blood Pressure Brother     Depression Maternal Grandmother        Social History     Tobacco Use    Smoking status: Never    Smokeless tobacco: Former     Types: Snuff     Quit date: 7/31/2021   Substance Use Topics    Alcohol use: No      Current Outpatient Medications   Medication Sig Dispense Refill    FLUoxetine (PROZAC) 40 MG capsule Take 1 capsule by mouth daily 90 capsule 0    omeprazole (PRILOSEC) 20 MG delayed release capsule Take 1 capsule by mouth every morning (before breakfast) 90 capsule 0     No current facility-administered medications for this visit. Allergies   Allergen Reactions    Azithromycin Headaches and Other (See Comments)     migraine         Review of Systems   Constitutional:  Negative for chills and fever. HENT:  Negative for facial swelling and mouth sores. Eyes:  Negative for discharge and itching. Respiratory:  Negative for apnea and stridor. Cardiovascular:  Negative for chest pain and palpitations. Gastrointestinal:  Negative for nausea and vomiting. Endocrine: Negative for cold intolerance and heat intolerance. Genitourinary:  Negative for frequency and urgency. Musculoskeletal:  Negative for arthralgias and back pain. Skin:  Negative for color change and rash. See HPI for visit specific review of symptoms. All others negative      Objective:   /70   Pulse 88   Temp 97.8 °F (36.6 °C)   Ht 5' 9\" (1.753 m)   Wt 194 lb 9.6 oz (88.3 kg)   SpO2 98%   BMI 28.74 kg/m²   Physical Exam  Constitutional:       Appearance: He is well-developed. HENT:      Head: Normocephalic and atraumatic.       Right Ear: External ear normal.      Left Ear: External ear normal.   Eyes: Conjunctiva/sclera: Conjunctivae normal.      Pupils: Pupils are equal, round, and reactive to light. Cardiovascular:      Rate and Rhythm: Normal rate and regular rhythm. Heart sounds: Normal heart sounds. Pulmonary:      Effort: Pulmonary effort is normal. No respiratory distress. Breath sounds: Normal breath sounds. Abdominal:      General: Bowel sounds are normal. There is no distension. Palpations: Abdomen is soft. Tenderness: There is no abdominal tenderness. Musculoskeletal:         General: Normal range of motion. Cervical back: Normal range of motion and neck supple. Skin:     General: Skin is warm. Capillary Refill: Capillary refill takes less than 2 seconds. Findings: No rash. Neurological:      Mental Status: He is alert and oriented to person, place, and time. Cranial Nerves: No cranial nerve deficit. Psychiatric:         Thought Content: Thought content normal.         Lab Review   No results found for this or any previous visit (from the past 672 hour(s)). Assessment & Plan: The following diagnoses and conditions are stable with no further orders unless indicated:    Patient Active Problem List    Diagnosis Date Noted    Vitamin B12 deficiency 03/03/2020     Overview Note:     Most recent labs were normal.      Vitamin D deficiency 03/03/2020     Overview Note:     Labs reviewed. He is taking 2000 Units daily, advised 800-1000 daily a sufficient but 2000 is also fine. He is going to recheck Vit D level in the next 7-10 days since he has been on this for 3 months. OTIS (generalized anxiety disorder) 03/03/2020     Overview Note:     Patient does not wish to restart medication at this time. Continue counseling. Overweight (BMI 25.0-29.9) 03/03/2020     Overview Note:     Recommended at least 150 minutes of exercise per week and resistance training 2 days a week. Discussed healthy diet habits.  Offered suggestions for calorie counting. MDD (major depressive disorder), recurrent episode, mild ([facility] [address] 03/03/2020    Adjustment disorder with mixed emotional features 08/21/2018        Austin Ivy was seen today for 1 year follow up. Diagnoses and all orders for this visit:    Annual physical exam  -     CBC with Auto Differential; Future  -     Comprehensive Metabolic Panel; Future    OTIS (generalized anxiety disorder)  -     FLUoxetine (PROZAC) 40 MG capsule; Take 1 capsule by mouth daily    Vitamin D deficiency  -     Vitamin D 25 Hydroxy; Future    MDD (major depressive disorder), recurrent episode, mild (HCC)  -     TSH; Future  -     FLUoxetine (PROZAC) 40 MG capsule; Take 1 capsule by mouth daily    Gastroesophageal reflux disease, unspecified whether esophagitis present  -     omeprazole (PRILOSEC) 20 MG delayed release capsule; Take 1 capsule by mouth every morning (before breakfast)    Other fatigue  -     T4, Free; Future      Health Maintenance   Topic Date Due    COVID-19 Vaccine (4 - Booster for Pfizer series) 12/07/2021    Depression Monitoring  11/15/2023    DTaP/Tdap/Td vaccine (8 - Td or Tdap) 01/01/2026    Hepatitis A vaccine  Completed    Hib vaccine  Completed    Meningococcal (ACWY) vaccine  Completed    Flu vaccine  Completed    HIV screen  Completed    Pneumococcal 0-64 years Vaccine  Aged Out    Varicella vaccine  Discontinued    Hepatitis C screen  Discontinued         Return in about 3 months (around 6/8/2023) for GERD and depression/anxiety, in office. Discussed use, benefit, and side effects of prescribed medications. All patient questions answered. Pt voiced understanding. Reviewed health maintenance. Instructed to continue current medications, diet and exercise. Patient agreedwith treatment plan. Follow up as directed. Old records reviewed, where available.     Rikki Romano MD    Note:  dictated using Dragon software

## 2023-03-12 PROBLEM — K21.9 GASTROESOPHAGEAL REFLUX DISEASE: Status: ACTIVE | Noted: 2023-03-12

## 2023-03-12 ASSESSMENT — ENCOUNTER SYMPTOMS
NAUSEA: 0
STRIDOR: 0
VOMITING: 0
BACK PAIN: 0
FACIAL SWELLING: 0
APNEA: 0
EYE DISCHARGE: 0
COLOR CHANGE: 0
EYE ITCHING: 0

## 2023-05-02 ENCOUNTER — TELEMEDICINE (OUTPATIENT)
Dept: PRIMARY CARE CLINIC | Age: 28
End: 2023-05-02
Payer: OTHER GOVERNMENT

## 2023-05-02 DIAGNOSIS — R41.840 CONCENTRATION DEFICIT: Primary | ICD-10-CM

## 2023-05-02 DIAGNOSIS — F41.1 GAD (GENERALIZED ANXIETY DISORDER): ICD-10-CM

## 2023-05-02 PROCEDURE — 99214 OFFICE O/P EST MOD 30 MIN: CPT | Performed by: FAMILY MEDICINE

## 2023-05-02 ASSESSMENT — ENCOUNTER SYMPTOMS
VOMITING: 0
APNEA: 0
BACK PAIN: 0
COLOR CHANGE: 0
EYE ITCHING: 0
STRIDOR: 0
FACIAL SWELLING: 0
EYE DISCHARGE: 0
NAUSEA: 0

## 2023-05-02 ASSESSMENT — PATIENT HEALTH QUESTIONNAIRE - PHQ9
4. FEELING TIRED OR HAVING LITTLE ENERGY: 0
6. FEELING BAD ABOUT YOURSELF - OR THAT YOU ARE A FAILURE OR HAVE LET YOURSELF OR YOUR FAMILY DOWN: 0
SUM OF ALL RESPONSES TO PHQ QUESTIONS 1-9: 4
SUM OF ALL RESPONSES TO PHQ QUESTIONS 1-9: 4
9. THOUGHTS THAT YOU WOULD BE BETTER OFF DEAD, OR OF HURTING YOURSELF: 0
SUM OF ALL RESPONSES TO PHQ QUESTIONS 1-9: 4
8. MOVING OR SPEAKING SO SLOWLY THAT OTHER PEOPLE COULD HAVE NOTICED. OR THE OPPOSITE, BEING SO FIGETY OR RESTLESS THAT YOU HAVE BEEN MOVING AROUND A LOT MORE THAN USUAL: 0
SUM OF ALL RESPONSES TO PHQ9 QUESTIONS 1 & 2: 0
5. POOR APPETITE OR OVEREATING: 0
1. LITTLE INTEREST OR PLEASURE IN DOING THINGS: 0
SUM OF ALL RESPONSES TO PHQ QUESTIONS 1-9: 4
3. TROUBLE FALLING OR STAYING ASLEEP: 1
7. TROUBLE CONCENTRATING ON THINGS, SUCH AS READING THE NEWSPAPER OR WATCHING TELEVISION: 3
2. FEELING DOWN, DEPRESSED OR HOPELESS: 0
10. IF YOU CHECKED OFF ANY PROBLEMS, HOW DIFFICULT HAVE THESE PROBLEMS MADE IT FOR YOU TO DO YOUR WORK, TAKE CARE OF THINGS AT HOME, OR GET ALONG WITH OTHER PEOPLE: 1

## 2023-05-02 NOTE — PROGRESS NOTES
Tyrel Haas (:  1995) is a Established patient, presenting virtually for evaluation of the following:    Assessment & Plan   Below is the assessment and plan developed based on review of pertinent history, physical exam, labs, studies, and medications. 1. Concentration deficit  2. OTIS (generalized anxiety disorder)    Advised I will leave self-report questionnaire at  for him to complete and bring back. Will proceed accordingly based on the responses. Return for already scheduled follow-up. PHQ Scores 2023 3/8/2023 11/15/2022 5/3/2021 3/3/2020   PHQ2 Score 0 2 0 1 0   PHQ9 Score 4 8 0 1 0     Interpretation of Total Score Depression Severity: 1-4 = Minimal depression, 5-9 = Mild depression, 10-14 = Moderate depression, 15-19 = Moderately severe depression, 20-27 = Severe depression     Subjective   HPI  Patient presents with possible ADHD. He states his wife has been noticing that he has difficulty completing tasks, for example, he did not complete fully loading the , and started doing something else. He was not diagnosed with ADHD as a child. He does also feel like his work is affected. He also does need some paperwork completed for work, as he is on the Prozac. Review of Systems   Constitutional:  Negative for chills and fever. HENT:  Negative for facial swelling and mouth sores. Eyes:  Negative for discharge and itching. Respiratory:  Negative for apnea and stridor. Cardiovascular:  Negative for chest pain and palpitations. Gastrointestinal:  Negative for nausea and vomiting. Endocrine: Negative for cold intolerance and heat intolerance. Genitourinary:  Negative for frequency and urgency. Musculoskeletal:  Negative for arthralgias and back pain. Skin:  Negative for color change and rash. Psychiatric/Behavioral:  Positive for decreased concentration.          Objective   Patient-Reported Vitals  No data recorded     Physical

## 2023-05-25 ENCOUNTER — OFFICE VISIT (OUTPATIENT)
Dept: PRIMARY CARE CLINIC | Age: 28
End: 2023-05-25
Payer: OTHER GOVERNMENT

## 2023-05-25 VITALS
DIASTOLIC BLOOD PRESSURE: 72 MMHG | BODY MASS INDEX: 29.55 KG/M2 | TEMPERATURE: 97.8 F | WEIGHT: 195 LBS | OXYGEN SATURATION: 99 % | HEIGHT: 68 IN | RESPIRATION RATE: 18 BRPM | SYSTOLIC BLOOD PRESSURE: 112 MMHG | HEART RATE: 88 BPM

## 2023-05-25 DIAGNOSIS — R53.83 OTHER FATIGUE: ICD-10-CM

## 2023-05-25 DIAGNOSIS — F90.2 ATTENTION DEFICIT HYPERACTIVITY DISORDER (ADHD), COMBINED TYPE: Primary | ICD-10-CM

## 2023-05-25 DIAGNOSIS — Z51.81 MEDICATION MONITORING ENCOUNTER: ICD-10-CM

## 2023-05-25 PROCEDURE — 80305 DRUG TEST PRSMV DIR OPT OBS: CPT | Performed by: FAMILY MEDICINE

## 2023-05-25 PROCEDURE — 99214 OFFICE O/P EST MOD 30 MIN: CPT | Performed by: FAMILY MEDICINE

## 2023-05-25 RX ORDER — DEXTROAMPHETAMINE SACCHARATE, AMPHETAMINE ASPARTATE, DEXTROAMPHETAMINE SULFATE AND AMPHETAMINE SULFATE 1.25; 1.25; 1.25; 1.25 MG/1; MG/1; MG/1; MG/1
5 TABLET ORAL 2 TIMES DAILY
Qty: 60 TABLET | Refills: 0 | Status: SHIPPED | OUTPATIENT
Start: 2023-05-25 | End: 2023-06-24

## 2023-05-30 ENCOUNTER — PATIENT MESSAGE (OUTPATIENT)
Dept: PRIMARY CARE CLINIC | Age: 28
End: 2023-05-30

## 2023-05-30 ASSESSMENT — ENCOUNTER SYMPTOMS
NAUSEA: 0
BACK PAIN: 0
VOMITING: 0
EYE ITCHING: 0
FACIAL SWELLING: 0
COLOR CHANGE: 0
EYE DISCHARGE: 0
STRIDOR: 0
APNEA: 0

## 2023-06-07 DIAGNOSIS — K21.9 GASTROESOPHAGEAL REFLUX DISEASE, UNSPECIFIED WHETHER ESOPHAGITIS PRESENT: ICD-10-CM

## 2023-06-07 DIAGNOSIS — F41.1 GAD (GENERALIZED ANXIETY DISORDER): ICD-10-CM

## 2023-06-07 DIAGNOSIS — F33.0 MDD (MAJOR DEPRESSIVE DISORDER), RECURRENT EPISODE, MILD (HCC): ICD-10-CM

## 2023-06-07 RX ORDER — FLUOXETINE HYDROCHLORIDE 40 MG/1
CAPSULE ORAL
Qty: 90 CAPSULE | Refills: 0 | Status: SHIPPED | OUTPATIENT
Start: 2023-06-07

## 2023-06-07 RX ORDER — OMEPRAZOLE 20 MG/1
CAPSULE, DELAYED RELEASE ORAL
Qty: 90 CAPSULE | Refills: 0 | Status: SHIPPED | OUTPATIENT
Start: 2023-06-07

## 2023-06-27 ENCOUNTER — OFFICE VISIT (OUTPATIENT)
Dept: PRIMARY CARE CLINIC | Age: 28
End: 2023-06-27
Payer: OTHER GOVERNMENT

## 2023-06-27 VITALS
WEIGHT: 192 LBS | HEART RATE: 80 BPM | DIASTOLIC BLOOD PRESSURE: 78 MMHG | TEMPERATURE: 97.4 F | SYSTOLIC BLOOD PRESSURE: 124 MMHG | HEIGHT: 68 IN | OXYGEN SATURATION: 98 % | BODY MASS INDEX: 29.1 KG/M2

## 2023-06-27 DIAGNOSIS — F90.2 ATTENTION DEFICIT HYPERACTIVITY DISORDER (ADHD), COMBINED TYPE: Primary | ICD-10-CM

## 2023-06-27 DIAGNOSIS — K21.9 GASTROESOPHAGEAL REFLUX DISEASE WITHOUT ESOPHAGITIS: ICD-10-CM

## 2023-06-27 DIAGNOSIS — A05.9 FOOD POISONING: ICD-10-CM

## 2023-06-27 PROCEDURE — 99214 OFFICE O/P EST MOD 30 MIN: CPT | Performed by: FAMILY MEDICINE

## 2023-06-27 RX ORDER — DICYCLOMINE HYDROCHLORIDE 10 MG/1
10 CAPSULE ORAL 4 TIMES DAILY PRN
Qty: 11 CAPSULE | Refills: 0 | Status: SHIPPED | OUTPATIENT
Start: 2023-06-27

## 2023-06-27 RX ORDER — DEXTROAMPHETAMINE SACCHARATE, AMPHETAMINE ASPARTATE, DEXTROAMPHETAMINE SULFATE AND AMPHETAMINE SULFATE 1.25; 1.25; 1.25; 1.25 MG/1; MG/1; MG/1; MG/1
5 TABLET ORAL 2 TIMES DAILY
Qty: 60 TABLET | Refills: 0 | Status: CANCELLED | OUTPATIENT
Start: 2023-06-27 | End: 2023-07-27

## 2023-06-27 RX ORDER — DEXTROAMPHETAMINE SACCHARATE, AMPHETAMINE ASPARTATE, DEXTROAMPHETAMINE SULFATE AND AMPHETAMINE SULFATE 2.5; 2.5; 2.5; 2.5 MG/1; MG/1; MG/1; MG/1
10 TABLET ORAL 2 TIMES DAILY
Qty: 60 TABLET | Refills: 0 | Status: SHIPPED | OUTPATIENT
Start: 2023-06-27 | End: 2023-07-27

## 2023-06-27 RX ORDER — ONDANSETRON 4 MG/1
4 TABLET, ORALLY DISINTEGRATING ORAL 3 TIMES DAILY PRN
Qty: 21 TABLET | Refills: 0 | Status: SHIPPED | OUTPATIENT
Start: 2023-06-27

## 2023-06-27 ASSESSMENT — PATIENT HEALTH QUESTIONNAIRE - PHQ9
SUM OF ALL RESPONSES TO PHQ9 QUESTIONS 1 & 2: 0
2. FEELING DOWN, DEPRESSED OR HOPELESS: 0
SUM OF ALL RESPONSES TO PHQ QUESTIONS 1-9: 0
1. LITTLE INTEREST OR PLEASURE IN DOING THINGS: 0
SUM OF ALL RESPONSES TO PHQ QUESTIONS 1-9: 0

## 2023-07-03 ASSESSMENT — ENCOUNTER SYMPTOMS
VOMITING: 0
SHORTNESS OF BREATH: 0
CONSTIPATION: 0
TROUBLE SWALLOWING: 0
COUGH: 0
ABDOMINAL PAIN: 1
NAUSEA: 1
DIARRHEA: 1

## 2023-07-05 ENCOUNTER — TELEPHONE (OUTPATIENT)
Dept: GASTROENTEROLOGY | Age: 28
End: 2023-07-05

## 2023-07-05 NOTE — TELEPHONE ENCOUNTER
The only thing I see are [employer]i Velazquez Referral/Authorization forms scanned in on 3/28/23, 5/26/23, and I got another one today 7/5/23. I've been told that these papers are just authorizations. Could it be used as a referral too?

## 2023-07-07 NOTE — TELEPHONE ENCOUNTER
Pt called back and left a VM saying he called [employer] and they said the authorization should be the only thing we need to schedule an OV. I think that's correct for the visit to be covered, but we need the referral part of it for the actual info on the patient, correct? I don't even see where the Shannan Gip shows what an apt is needed for. Is this correct? Pt wanted more clarity.

## 2023-07-07 NOTE — TELEPHONE ENCOUNTER
I spoke to pt and he said he will call the [facility] where he sees a PCP and see if they can fax us an actual referral.

## 2023-07-07 NOTE — TELEPHONE ENCOUNTER
You were correct in that we did receive the  Auth/ Referral but we are needing an actual referral from his PCP. Will you please call the patient and let him know that?  I will go ahead and scan in the  Auth- AA

## 2023-07-07 NOTE — TELEPHONE ENCOUNTER
Pt. Called to check status of referral. Advised pt. We received auth from insurance but not a referral. What we do have is under review and pt. Would be contacted.

## 2023-07-10 ENCOUNTER — TELEPHONE (OUTPATIENT)
Dept: GASTROENTEROLOGY | Age: 28
End: 2023-07-10

## 2023-07-10 NOTE — TELEPHONE ENCOUNTER
Patient requesting return call from [facility] in regards to [facility] authorization / referral. Please return his call anytime [phone]      Thank you

## 2023-07-11 NOTE — TELEPHONE ENCOUNTER
Do you mind to call him today for me? He called yesterday with [facility] on the phone. We have received the  auth but I was trying to tell them we needed a PCP referral as well. He seemed to understand that yesterday but not sure what else he needs. If you could see if he truly needs me or if you can answer his questions, I would greatly appreciate it.

## 2023-07-11 NOTE — TELEPHONE ENCOUNTER
I called pt back and he said that the referring provider is needing us to type up what we are looking for in regards to the referral/supporting the referral.     Pt asked if I could upload what we needed to [address] [place] [address], so he can send it to them. I told him I'm not able to upload things, but I can send him a [employer] message of what we are needing. This is the message I sent in [employer]:     Veena Yoder,    We have the [address] [place] [address], but we are needing a referral faxed to us. We usually receive these from your Primary Care Provider or somewhere like an Urgent Care Clinic. Along with that referral, we need any office notes, labs, radiology results, and H&P to be faxed to us to support that referral. Our fax number is [phone].     Thank you,  John

## 2023-07-24 ENCOUNTER — TELEPHONE (OUTPATIENT)
Dept: PRIMARY CARE CLINIC | Age: 28
End: 2023-07-24

## 2023-07-24 DIAGNOSIS — K21.9 GASTROESOPHAGEAL REFLUX DISEASE WITHOUT ESOPHAGITIS: Primary | ICD-10-CM

## 2023-07-24 DIAGNOSIS — R10.9 ABDOMINAL CRAMPING: ICD-10-CM

## 2023-08-10 ENCOUNTER — OFFICE VISIT (OUTPATIENT)
Dept: GASTROENTEROLOGY | Age: 28
End: 2023-08-10
Payer: OTHER GOVERNMENT

## 2023-08-10 VITALS
HEIGHT: 68 IN | BODY MASS INDEX: 29.34 KG/M2 | WEIGHT: 193.6 LBS | DIASTOLIC BLOOD PRESSURE: 80 MMHG | HEART RATE: 98 BPM | SYSTOLIC BLOOD PRESSURE: 110 MMHG | OXYGEN SATURATION: 94 %

## 2023-08-10 DIAGNOSIS — K21.9 GASTROESOPHAGEAL REFLUX DISEASE, UNSPECIFIED WHETHER ESOPHAGITIS PRESENT: Primary | ICD-10-CM

## 2023-08-10 PROCEDURE — 99204 OFFICE O/P NEW MOD 45 MIN: CPT | Performed by: NURSE PRACTITIONER

## 2023-08-10 NOTE — PATIENT INSTRUCTIONS
make it unsafe for you to drive or get home on your own. Understand exactly what procedure is planned, along with the risks, benefits, and other options. Tell your doctor ALL the medicines, vitamins, supplements, and herbal remedies you take. Some may increase the risk of problems during your procedure. Your doctor will tell you if you should stop taking any of them before the procedure and how soon to do it. If you take a medicine that prevents blood clots, your doctor may tell you to stop taking it before your procedure. Or your doctor may tell you to keep taking it. (These medicines include aspirin and other blood thinners.) Make sure that you understand exactly what your doctor wants you to do. Make sure your doctor and the hospital have a copy of your advance directive. If you don't have one, you may want to prepare one. It lets others know your health care wishes. It's a good thing to have before any type of surgery or procedure. What happens on the day of the procedure? Follow the instructions exactly about when to stop eating and drinking. If you don't, your procedure may be canceled. If your doctor told you to take your medicines on the day of the procedure, take them with only a sip of water. Take a bath or shower before you come in for your procedure. Do not apply lotions, perfumes, deodorants, or nail polish. Take off all jewelry and piercings. And take out contact lenses, if you wear them. At the hospital or surgery center   Bring a picture ID. The test may take 15 to 30 minutes. The doctor may spray medicine on the back of your throat to numb it. You also will get medicine to prevent pain and to relax you. You will lie on your left side. The doctor will put the scope in your mouth and toward the back of your throat. The doctor will tell you when to swallow. This helps the scope move down your throat. You will be able to breathe normally.  The doctor will move the

## 2023-08-13 ASSESSMENT — ENCOUNTER SYMPTOMS
ANAL BLEEDING: 0
SHORTNESS OF BREATH: 0
CONSTIPATION: 0
ABDOMINAL DISTENTION: 0
VOMITING: 0
BLOOD IN STOOL: 0
CHOKING: 0
RECTAL PAIN: 0
COUGH: 0
ABDOMINAL PAIN: 0
TROUBLE SWALLOWING: 0
NAUSEA: 0
DIARRHEA: 0

## 2023-08-15 DIAGNOSIS — F33.0 MDD (MAJOR DEPRESSIVE DISORDER), RECURRENT EPISODE, MILD (HCC): ICD-10-CM

## 2023-08-15 DIAGNOSIS — F41.1 GAD (GENERALIZED ANXIETY DISORDER): ICD-10-CM

## 2023-08-15 RX ORDER — FLUOXETINE HYDROCHLORIDE 40 MG/1
40 CAPSULE ORAL DAILY
Qty: 90 CAPSULE | Refills: 3 | Status: SHIPPED | OUTPATIENT
Start: 2023-08-15

## 2023-08-23 ENCOUNTER — TELEPHONE (OUTPATIENT)
Dept: GASTROENTEROLOGY | Age: 28
End: 2023-08-23

## 2023-08-23 NOTE — TELEPHONE ENCOUNTER
Called patient to remind them of their procedure with Dr. Manas Beckham  at [facility]  on 8/28/23 to arrive at [address]

## 2023-08-24 ENCOUNTER — ANESTHESIA EVENT (OUTPATIENT)
Dept: OPERATING ROOM | Age: 28
End: 2023-08-24

## 2023-08-28 ENCOUNTER — ANESTHESIA (OUTPATIENT)
Dept: OPERATING ROOM | Age: 28
End: 2023-08-28

## 2023-08-28 ENCOUNTER — APPOINTMENT (OUTPATIENT)
Dept: OPERATING ROOM | Age: 28
End: 2023-08-28
Attending: INTERNAL MEDICINE

## 2023-08-28 ENCOUNTER — HOSPITAL ENCOUNTER (OUTPATIENT)
Age: 28
Setting detail: OUTPATIENT SURGERY
Discharge: HOME | End: 2023-08-28
Attending: INTERNAL MEDICINE | Admitting: INTERNAL MEDICINE

## 2023-08-28 ENCOUNTER — HOSPITAL ENCOUNTER (OUTPATIENT)
Age: 28
Setting detail: SPECIMEN
Discharge: HOME | End: 2023-08-28
Payer: OTHER GOVERNMENT

## 2023-08-28 VITALS
RESPIRATION RATE: 18 BRPM | WEIGHT: 195 LBS | DIASTOLIC BLOOD PRESSURE: 69 MMHG | BODY MASS INDEX: 29.55 KG/M2 | OXYGEN SATURATION: 98 % | HEART RATE: 60 BPM | SYSTOLIC BLOOD PRESSURE: 103 MMHG | HEIGHT: 68 IN | TEMPERATURE: 97.1 F

## 2023-08-28 PROCEDURE — G8918 PT W/O PREOP ORDER IV AB PRO: HCPCS

## 2023-08-28 PROCEDURE — G8907 PT DOC NO EVENTS ON DISCHARG: HCPCS

## 2023-08-28 PROCEDURE — 88342 IMHCHEM/IMCYTCHM 1ST ANTB: CPT

## 2023-08-28 PROCEDURE — 88305 TISSUE EXAM BY PATHOLOGIST: CPT

## 2023-08-28 PROCEDURE — 43239 EGD BIOPSY SINGLE/MULTIPLE: CPT

## 2023-08-28 RX ORDER — PROPOFOL 10 MG/ML
INJECTION, EMULSION INTRAVENOUS PRN
Status: DISCONTINUED | OUTPATIENT
Start: 2023-08-28 | End: 2023-08-28 | Stop reason: SDUPTHER

## 2023-08-28 RX ORDER — SODIUM CHLORIDE, SODIUM LACTATE, POTASSIUM CHLORIDE, CALCIUM CHLORIDE 600; 310; 30; 20 MG/100ML; MG/100ML; MG/100ML; MG/100ML
INJECTION, SOLUTION INTRAVENOUS CONTINUOUS PRN
Status: DISCONTINUED | OUTPATIENT
Start: 2023-08-28 | End: 2023-08-28 | Stop reason: SDUPTHER

## 2023-08-28 RX ORDER — LIDOCAINE HYDROCHLORIDE 10 MG/ML
INJECTION, SOLUTION INFILTRATION; PERINEURAL PRN
Status: DISCONTINUED | OUTPATIENT
Start: 2023-08-28 | End: 2023-08-28 | Stop reason: SDUPTHER

## 2023-08-28 RX ORDER — MIDAZOLAM HYDROCHLORIDE 1 MG/ML
INJECTION INTRAMUSCULAR; INTRAVENOUS PRN
Status: DISCONTINUED | OUTPATIENT
Start: 2023-08-28 | End: 2023-08-28 | Stop reason: SDUPTHER

## 2023-08-28 RX ORDER — SODIUM CHLORIDE, SODIUM LACTATE, POTASSIUM CHLORIDE, CALCIUM CHLORIDE 600; 310; 30; 20 MG/100ML; MG/100ML; MG/100ML; MG/100ML
INJECTION, SOLUTION INTRAVENOUS CONTINUOUS
Status: DISCONTINUED | OUTPATIENT
Start: 2023-08-28 | End: 2023-08-28 | Stop reason: HOSPADM

## 2023-08-28 RX ADMIN — SODIUM CHLORIDE, SODIUM LACTATE, POTASSIUM CHLORIDE, CALCIUM CHLORIDE: 600; 310; 30; 20 INJECTION, SOLUTION INTRAVENOUS at 12:31

## 2023-08-28 RX ADMIN — MIDAZOLAM HYDROCHLORIDE 2 MG: 1 INJECTION INTRAMUSCULAR; INTRAVENOUS at 13:18

## 2023-08-28 RX ADMIN — LIDOCAINE HYDROCHLORIDE 40 MG: 10 INJECTION, SOLUTION INFILTRATION; PERINEURAL at 13:22

## 2023-08-28 RX ADMIN — PROPOFOL 250 MG: 10 INJECTION, EMULSION INTRAVENOUS at 13:22

## 2023-08-28 RX ADMIN — SODIUM CHLORIDE, SODIUM LACTATE, POTASSIUM CHLORIDE, CALCIUM CHLORIDE: 600; 310; 30; 20 INJECTION, SOLUTION INTRAVENOUS at 13:15

## 2023-08-28 NOTE — ANESTHESIA POSTPROCEDURE EVALUATION
Department of Anesthesiology  Postprocedure Note    Patient: Natasha Shaw  MRN: [id number]  [facility] [place] 1995  Date of evaluation: 8/28/2023      Procedure Summary     Date: 08/28/23 Room / Location: [facility] ENDO 01 / [address]    Anesthesia Start: 1485 Anesthesia Stop:     Procedure: EGD BIOPSY Diagnosis:       Gastroesophageal reflux disease without esophagitis      (Gastroesophageal reflux disease without esophagitis [K21.9])    Surgeons: Diana Flower MD Responsible Provider: RHIANNA Travis CRNA    Anesthesia Type: general, TIVA ASA Status: 2          Anesthesia Type: No value filed.     Bola Phase I:      Bola Phase II:        Anesthesia Post Evaluation    Patient location during evaluation: bedside  Patient participation: complete - patient participated  Level of consciousness: sleepy but conscious  Pain score: 0  Airway patency: patent  Nausea & Vomiting: no nausea and no vomiting  Complications: no  Cardiovascular status: hemodynamically stable and blood pressure returned to baseline  Respiratory status: acceptable and nasal cannula  Hydration status: stable  Pain management: adequate

## 2023-08-28 NOTE — OP NOTE
Endoscopic Procedure Note    Patient: Margarita Armando : 1995  Med Rec#: [id number] Acc#: [id number]     Primary Care Provider Juan Antonio Sifuentes MD  Referring Provider: RHIANNA Guzman    Endoscopist: Dea Montanez MD    Date of Procedure:  2023    Procedure:   1. EGD with biopsy    Indications:   1. Reflux     Anesthesia:  Sedation was administered by anesthesia who monitored the patient during the procedure. Estimated Blood Loss: minimal    Procedure:   After reviewing the patient's chart and obtaining informed consent, the patient was placed in the left lateral decubitus position. A forward-viewing Olympus endoscope was lubricated and inserted through the mouth into the oropharynx. Under direct visualization, the upper esophagus was intubated. The scope was advanced to the level of the third portion of duodenum. Scope was slowly withdrawn with careful inspection of the mucosal surfaces. The scope was retroflexed for inspection of the gastric fundus and incisura. Findings and maneuvers are listed in impression below. The patient tolerated the procedure well. The scope was removed. There were no immediate complications. Findings:   Esophagus: abnormal: the GE junction appeared normal and is at 34 cm. There is a 4 cm hiatal hernia present. Stomach:  Abnormal: Mild mucosal changes suggestive of gastritis noted -  Gastric biopsies were taken from the antrum and body to rule out Helicobacter pylori infection. Duodenum: normal      IMPRESSION:  1. Hiatal hernia     RECOMMENDATIONS:    1. Await path results  2. Continue PPI  3. Repeat EGD as needed    The results were discussed with the patient and family. A copy of the images obtained were given to the patient.      Lukasz Malave am scribing for and in the presence of Dr. Dea Montanez MD.  Electronically signed by Brook Santizo RN on 2023 at 1:21 PM    I personally performed the services described in

## 2023-08-28 NOTE — H&P
Patient Name: Beto Osborn  : 1995  MRN: [id number]  DATE: 23    Allergies: Allergies   Allergen Reactions    Azithromycin Headaches and Other (See Comments)     migraine          ENDOSCOPY  History and Physical    Procedure:    [] Diagnostic Colonoscopy       [] Screening Colonoscopy  [x] EGD      [] ERCP      [] EUS       [] Other    [x] Previous office notes/History and Physical reviewed from the patients chart. Please see EMR for further details of HPI. I have examined the patient's status immediately prior to the procedure and:      Indications/HPI:    []Abdominal Pain   []Barretts  []Screening/Surveillance   []History of Polyps  []Dysphagia            [] +Cologard/DNA testing  []Abnormal Imaging              []EOE Hx              [] Family Hx of CRC/Polyps  []Anemia                            []Food Impaction       []Recent Poor Prep  []GI Bleed             []Lymphadenopathy  []History of Polyps  []Change in bowel habits [x]Heartburn/Reflux  []Cancer- GI/Lung  []Chest Pain - Non Cardiac []Heme (+) Stool []Ulcers  []Constipation  []Hemoptysis  []Incontinence    []Diarrhea  []Hypoxemia  []Rectal Bleed (BRBPR)  []Nausea/Vomiting   [] Varices  []Crohns/Colitis  []Pancreatic Cyst   [] Cirrhosis   []Pancreatitis    []Abnormal MRCP  []Elevated LFT [] Stent Removal, Previous ERCP  []Other:     Anesthesia:   [x] MAC [] Moderate Sedation   [] General   [] None     ROS: 12 pt Review of Symptoms was negative unless mentioned above    Medications:   Prior to Admission medications    Medication Sig Start Date End Date Taking? Authorizing Provider   FLUoxetine (PROZAC) 40 MG capsule Take 1 capsule by mouth daily 8/15/23   Jania Mckenzie MD   amphetamine-dextroamphetamine (ADDERALL, 10MG,) 10 MG tablet Take 1 tablet by mouth 2 times daily for 30 days.  Max Daily Amount: 20 mg 6/27/23 8/10/23  Jania Mckenzie MD   omeprazole (PRILOSEC) 20 MG delayed release capsule TAKE 1 CAPSULE BY MOUTH

## 2023-08-28 NOTE — DISCHARGE INSTRUCTIONS
RECOMMENDATIONS:    1. Await path results  2. Continue PPI  3. Repeat EGD as needed    POST-OP ORDERS: ENDOSCOPY & COLONOSCOPY:    1. Rest today. 2. DO NOT eat or drink until wide awake; eat your usual diet today in moderate amount only. 3. DO NOT drive today. 4. Call physician if you have severe pain, vomiting, fever, rectal bleeding or black bowel movements. 5.  If a biopsy was taken or a polyp removed, you should expect to hear results in about 7-10 days. If you have heard nothing from your physician by then, call the office for results. 6.  Discharge home when patient awake, vitals signs stable and tolerating liquids. 7. Call with questions or concerns [phone].

## 2023-09-20 DIAGNOSIS — F90.2 ATTENTION DEFICIT HYPERACTIVITY DISORDER (ADHD), COMBINED TYPE: ICD-10-CM

## 2023-09-22 RX ORDER — DEXTROAMPHETAMINE SACCHARATE, AMPHETAMINE ASPARTATE, DEXTROAMPHETAMINE SULFATE AND AMPHETAMINE SULFATE 2.5; 2.5; 2.5; 2.5 MG/1; MG/1; MG/1; MG/1
10 TABLET ORAL 2 TIMES DAILY
Qty: 60 TABLET | Refills: 0 | Status: SHIPPED | OUTPATIENT
Start: 2023-09-22 | End: 2023-10-22

## 2023-10-10 ENCOUNTER — OFFICE VISIT (OUTPATIENT)
Dept: PRIMARY CARE CLINIC | Age: 28
End: 2023-10-10
Payer: OTHER GOVERNMENT

## 2023-10-10 VITALS
HEIGHT: 69 IN | WEIGHT: 189 LBS | HEART RATE: 83 BPM | OXYGEN SATURATION: 96 % | SYSTOLIC BLOOD PRESSURE: 122 MMHG | TEMPERATURE: 97.1 F | DIASTOLIC BLOOD PRESSURE: 78 MMHG | BODY MASS INDEX: 27.99 KG/M2

## 2023-10-10 DIAGNOSIS — K21.9 GASTROESOPHAGEAL REFLUX DISEASE, UNSPECIFIED WHETHER ESOPHAGITIS PRESENT: ICD-10-CM

## 2023-10-10 DIAGNOSIS — Z23 FLU VACCINE NEED: ICD-10-CM

## 2023-10-10 DIAGNOSIS — F90.2 ATTENTION DEFICIT HYPERACTIVITY DISORDER (ADHD), COMBINED TYPE: Primary | ICD-10-CM

## 2023-10-10 DIAGNOSIS — Z30.09 VASECTOMY EVALUATION: ICD-10-CM

## 2023-10-10 PROCEDURE — 90471 IMMUNIZATION ADMIN: CPT | Performed by: FAMILY MEDICINE

## 2023-10-10 PROCEDURE — 90674 CCIIV4 VAC NO PRSV 0.5 ML IM: CPT | Performed by: FAMILY MEDICINE

## 2023-10-10 PROCEDURE — 99214 OFFICE O/P EST MOD 30 MIN: CPT | Performed by: FAMILY MEDICINE

## 2023-10-10 NOTE — PROGRESS NOTES
Reason For Visit:   He is here for a follow up on ADHD. Combined HPI and A/P:      Diagnosis Orders   1. Attention deficit hyperactivity disorder (ADHD), combined type  amphetamine-dextroamphetamine (ADDERALL, 10MG,) 10 MG tablet      2. Gastroesophageal reflux disease, unspecified whether esophagitis present  omeprazole (PRILOSEC) 20 MG delayed release capsule      3. Flu vaccine need  Influenza, FLUCELVAX, (age 10 mo+), IM, Preservative Free, 0.5 mL      4. Vasectomy evaluation  External Referral To Urology          1.) ADHD:       - This is a chronic, controlled problem. He is currently taking Adderall 10 mg 1 tab BID. He feels that this is helping to control his symptoms. He denies any side effects from the medication. I will continue these medications at the current dose. - The patient UDS, drug contract, controlled prescription monitoring is up to date and appropriate. 2.) He is interested in getting a Vasectomy. I will refer to [facility] for evaluation. Return in about 3 months (around 1/10/2024) for ADHD. We discussed use, benefit, and side effects of prescribed medications. All patient questions answered. Patient agreed with treatment plan. I reviewed available records in our system and care everywhere. In cases where records are not available, the records have been requested and will be reviewed when available.      Subjective      Past Surgical History:   Procedure Laterality Date    UPPER GASTROINTESTINAL ENDOSCOPY N/A 08/28/2023    Dr Jb Chavez, gastritis, no h pylori     Family History   Problem Relation Age of Onset    Depression Mother     Stomach Cancer Father     Depression Brother     High Blood Pressure Brother     Esophageal Cancer Brother     Depression Maternal Grandmother     Colon Cancer Neg Hx     Colon Polyps Neg Hx     Liver Cancer Neg Hx     Liver Disease Neg Hx     Rectal Cancer Neg Hx      Social History     Tobacco Use    Smoking status: Never

## 2023-10-10 NOTE — PROGRESS NOTES
After obtaining consent, and per orders of Dr. Doug Fox, injection of Flu given in Left deltoid by Abraham Lozoya MA. Patient instructed to remain in clinic for 20 minutes afterwards, and to report any adverse reaction to me immediately.

## 2023-10-16 RX ORDER — DEXTROAMPHETAMINE SACCHARATE, AMPHETAMINE ASPARTATE, DEXTROAMPHETAMINE SULFATE AND AMPHETAMINE SULFATE 2.5; 2.5; 2.5; 2.5 MG/1; MG/1; MG/1; MG/1
10 TABLET ORAL 2 TIMES DAILY
Qty: 60 TABLET | Refills: 0 | Status: SHIPPED | OUTPATIENT
Start: 2023-10-16 | End: 2023-11-15

## 2023-10-16 RX ORDER — OMEPRAZOLE 20 MG/1
20 CAPSULE, DELAYED RELEASE ORAL
Qty: 90 CAPSULE | Refills: 3 | Status: SHIPPED | OUTPATIENT
Start: 2023-10-16

## 2023-10-16 ASSESSMENT — ENCOUNTER SYMPTOMS
TROUBLE SWALLOWING: 0
DIARRHEA: 0
CONSTIPATION: 0
ABDOMINAL PAIN: 0
SHORTNESS OF BREATH: 0
VOMITING: 0
NAUSEA: 0
COUGH: 0

## 2023-10-22 ENCOUNTER — HOSPITAL ENCOUNTER (EMERGENCY)
Age: 28
Discharge: HOME | End: 2023-10-22
Attending: EMERGENCY MEDICINE
Payer: COMMERCIAL

## 2023-10-22 VITALS
DIASTOLIC BLOOD PRESSURE: 76 MMHG | TEMPERATURE: 98.8 F | HEART RATE: 88 BPM | SYSTOLIC BLOOD PRESSURE: 117 MMHG | BODY MASS INDEX: 27.28 KG/M2 | HEIGHT: 68 IN | RESPIRATION RATE: 15 BRPM | OXYGEN SATURATION: 98 % | WEIGHT: 180 LBS

## 2023-10-22 DIAGNOSIS — Z77.21 EXPOSURE TO BLOOD OR BODY FLUID: Primary | ICD-10-CM

## 2023-10-22 LAB
HBV SURFACE AB SERPL IA-ACNC: REACTIVE M[IU]/ML
HBV SURFACE AG SERPL QL IA: NORMAL
HCV AB SERPL QL IA: NORMAL
HIV-1 P24 AG: NORMAL
HIV1+2 AB SERPLBLD QL IA.RAPID: NORMAL

## 2023-10-22 PROCEDURE — 87806 HIV AG W/HIV1&2 ANTB W/OPTIC: CPT

## 2023-10-22 PROCEDURE — 86706 HEP B SURFACE ANTIBODY: CPT

## 2023-10-22 PROCEDURE — 86704 HEP B CORE ANTIBODY TOTAL: CPT

## 2023-10-22 PROCEDURE — 99282 EMERGENCY DEPT VISIT SF MDM: CPT

## 2023-10-22 PROCEDURE — 86803 HEPATITIS C AB TEST: CPT

## 2023-10-22 PROCEDURE — 87340 HEPATITIS B SURFACE AG IA: CPT

## 2023-10-22 PROCEDURE — 36415 COLL VENOUS BLD VENIPUNCTURE: CPT

## 2023-10-22 ASSESSMENT — ENCOUNTER SYMPTOMS
SORE THROAT: 0
COUGH: 0
NAUSEA: 0
DIARRHEA: 0
ABDOMINAL PAIN: 0
RHINORRHEA: 0
BACK PAIN: 0
SHORTNESS OF BREATH: 0
VOMITING: 0

## 2023-10-22 ASSESSMENT — PAIN - FUNCTIONAL ASSESSMENT: PAIN_FUNCTIONAL_ASSESSMENT: NONE - DENIES PAIN

## 2023-10-24 LAB — HBV CORE AB SERPL QL IA: NEGATIVE
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
## 2023-10-30 ENCOUNTER — OFFICE VISIT (OUTPATIENT)
Dept: PRIMARY CARE CLINIC | Age: 28
End: 2023-10-30
Payer: OTHER GOVERNMENT

## 2023-10-30 VITALS
WEIGHT: 184 LBS | OXYGEN SATURATION: 96 % | DIASTOLIC BLOOD PRESSURE: 72 MMHG | HEIGHT: 68 IN | TEMPERATURE: 97.7 F | BODY MASS INDEX: 27.89 KG/M2 | SYSTOLIC BLOOD PRESSURE: 124 MMHG | HEART RATE: 84 BPM

## 2023-10-30 DIAGNOSIS — Z77.21 EXPOSURE TO BLOOD OR BODY FLUID: Primary | ICD-10-CM

## 2023-10-30 PROCEDURE — 99213 OFFICE O/P EST LOW 20 MIN: CPT | Performed by: FAMILY MEDICINE

## 2023-10-30 ASSESSMENT — ENCOUNTER SYMPTOMS
COUGH: 0
VOMITING: 0
NAUSEA: 0
TROUBLE SWALLOWING: 0
CONSTIPATION: 0
DIARRHEA: 0
ABDOMINAL PAIN: 0
SHORTNESS OF BREATH: 0

## 2023-11-13 ENCOUNTER — HOSPITAL ENCOUNTER (EMERGENCY)
Age: 28
Discharge: HOME | End: 2023-11-13
Attending: EMERGENCY MEDICINE
Payer: OTHER GOVERNMENT

## 2023-11-13 ENCOUNTER — OFFICE VISIT (OUTPATIENT)
Age: 28
End: 2023-11-13

## 2023-11-13 VITALS
TEMPERATURE: 97.2 F | DIASTOLIC BLOOD PRESSURE: 83 MMHG | HEART RATE: 75 BPM | WEIGHT: 180 LBS | RESPIRATION RATE: 18 BRPM | BODY MASS INDEX: 27.28 KG/M2 | SYSTOLIC BLOOD PRESSURE: 129 MMHG | OXYGEN SATURATION: 95 %

## 2023-11-13 DIAGNOSIS — T78.40XA ALLERGIC REACTION, INITIAL ENCOUNTER: Primary | ICD-10-CM

## 2023-11-13 PROCEDURE — NBSRV NON-BILLABLE SERVICE

## 2023-11-13 PROCEDURE — 96372 THER/PROPH/DIAG INJ SC/IM: CPT

## 2023-11-13 PROCEDURE — 99284 EMERGENCY DEPT VISIT MOD MDM: CPT

## 2023-11-13 PROCEDURE — 6360000002 HC RX W HCPCS: Performed by: EMERGENCY MEDICINE

## 2023-11-13 RX ORDER — DEXAMETHASONE SODIUM PHOSPHATE 10 MG/ML
10 INJECTION, SOLUTION INTRAMUSCULAR; INTRAVENOUS ONCE
Status: COMPLETED | OUTPATIENT
Start: 2023-11-13 | End: 2023-11-13

## 2023-11-13 RX ORDER — PREDNISOLONE ACETATE 10 MG/ML
1 SUSPENSION/ DROPS OPHTHALMIC
Status: DISCONTINUED | OUTPATIENT
Start: 2023-11-13 | End: 2023-11-13 | Stop reason: HOSPADM

## 2023-11-13 RX ORDER — DIPHENHYDRAMINE HYDROCHLORIDE 50 MG/ML
50 INJECTION INTRAMUSCULAR; INTRAVENOUS ONCE
Status: COMPLETED | OUTPATIENT
Start: 2023-11-13 | End: 2023-11-13

## 2023-11-13 RX ADMIN — DEXAMETHASONE SODIUM PHOSPHATE 10 MG: 10 INJECTION, SOLUTION INTRAMUSCULAR; INTRAVENOUS at 16:53

## 2023-11-13 RX ADMIN — DIPHENHYDRAMINE HYDROCHLORIDE 50 MG: 50 INJECTION, SOLUTION INTRAMUSCULAR; INTRAVENOUS at 16:53

## 2023-11-13 NOTE — PROGRESS NOTES
Pt presents with c/o right eye/eyelid redness, swelling, irritation, itching, blurry vision  x about 1 hour. Pt denies exposure to known allergens. Denies oral/throat itching, tingling, swelling; denies shortness of breath, difficulty breathing/swallowing. S/s moderate. Pt is stable. Upon exam, the right eye is with erythema, watery drainage; right eyelid and surrounding area is with moderate edema, erythema; the right eyelid is almost swollen shut. Erythema is spreading across the face and down the neck while pt is in office. Pt was advised to report to ER r/t fast onset and progression of reaction. Pt agrees with this. Offered to call an ambulance for transport to hospital but pt declines. Pt is stable when he leaves clinic.
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
## 2023-11-17 DIAGNOSIS — F90.2 ATTENTION DEFICIT HYPERACTIVITY DISORDER (ADHD), COMBINED TYPE: ICD-10-CM

## 2023-11-17 RX ORDER — DEXTROAMPHETAMINE SACCHARATE, AMPHETAMINE ASPARTATE, DEXTROAMPHETAMINE SULFATE AND AMPHETAMINE SULFATE 2.5; 2.5; 2.5; 2.5 MG/1; MG/1; MG/1; MG/1
10 TABLET ORAL 2 TIMES DAILY
Qty: 60 TABLET | Refills: 0 | Status: SHIPPED | OUTPATIENT
Start: 2023-11-17 | End: 2023-12-17

## 2023-11-17 NOTE — TELEPHONE ENCOUNTER
Nicolas Del Valle called requesting a refill of the below medication which has been pended for you:     Requested Prescriptions     Pending Prescriptions Disp Refills    amphetamine-dextroamphetamine (ADDERALL, 10MG,) 10 MG tablet 60 tablet 0     Sig: Take 1 tablet by mouth 2 times daily for 30 days.  Max Daily Amount: 20 mg       Last Appointment Date: 10/30/2023  Next Appointment Date: 1/9/2024    Allergies   Allergen Reactions    Azithromycin Headaches and Other (See Comments)     migraine

## 2023-11-23 ASSESSMENT — ENCOUNTER SYMPTOMS
SHORTNESS OF BREATH: 0
EYE ITCHING: 1
FACIAL SWELLING: 1
WHEEZING: 0

## 2024-01-01 DIAGNOSIS — F90.2 ATTENTION DEFICIT HYPERACTIVITY DISORDER (ADHD), COMBINED TYPE: ICD-10-CM

## 2024-01-02 RX ORDER — DEXTROAMPHETAMINE SACCHARATE, AMPHETAMINE ASPARTATE, DEXTROAMPHETAMINE SULFATE AND AMPHETAMINE SULFATE 2.5; 2.5; 2.5; 2.5 MG/1; MG/1; MG/1; MG/1
10 TABLET ORAL 2 TIMES DAILY
Qty: 60 TABLET | Refills: 0 | Status: SHIPPED | OUTPATIENT
Start: 2024-01-02 | End: 2024-02-01

## 2024-01-16 ENCOUNTER — OFFICE VISIT (OUTPATIENT)
Dept: PRIMARY CARE CLINIC | Age: 29
End: 2024-01-16

## 2024-01-16 VITALS
HEART RATE: 71 BPM | TEMPERATURE: 97.4 F | WEIGHT: 196 LBS | DIASTOLIC BLOOD PRESSURE: 70 MMHG | BODY MASS INDEX: 29.7 KG/M2 | OXYGEN SATURATION: 99 % | SYSTOLIC BLOOD PRESSURE: 118 MMHG | HEIGHT: 68 IN

## 2024-01-16 DIAGNOSIS — Z77.21 EXPOSURE TO BLOOD OR BODY FLUID: ICD-10-CM

## 2024-01-16 DIAGNOSIS — Z77.21 EXPOSURE TO BLOOD OR BODY FLUID: Primary | ICD-10-CM

## 2024-01-16 ASSESSMENT — PATIENT HEALTH QUESTIONNAIRE - PHQ9
8. MOVING OR SPEAKING SO SLOWLY THAT OTHER PEOPLE COULD HAVE NOTICED. OR THE OPPOSITE, BEING SO FIGETY OR RESTLESS THAT YOU HAVE BEEN MOVING AROUND A LOT MORE THAN USUAL: 0
4. FEELING TIRED OR HAVING LITTLE ENERGY: 0
SUM OF ALL RESPONSES TO PHQ QUESTIONS 1-9: 1
6. FEELING BAD ABOUT YOURSELF - OR THAT YOU ARE A FAILURE OR HAVE LET YOURSELF OR YOUR FAMILY DOWN: 0
3. TROUBLE FALLING OR STAYING ASLEEP: 1
5. POOR APPETITE OR OVEREATING: 0
10. IF YOU CHECKED OFF ANY PROBLEMS, HOW DIFFICULT HAVE THESE PROBLEMS MADE IT FOR YOU TO DO YOUR WORK, TAKE CARE OF THINGS AT HOME, OR GET ALONG WITH OTHER PEOPLE: 0
2. FEELING DOWN, DEPRESSED OR HOPELESS: 0
SUM OF ALL RESPONSES TO PHQ QUESTIONS 1-9: 1
SUM OF ALL RESPONSES TO PHQ9 QUESTIONS 1 & 2: 0
1. LITTLE INTEREST OR PLEASURE IN DOING THINGS: 0
SUM OF ALL RESPONSES TO PHQ QUESTIONS 1-9: 1
9. THOUGHTS THAT YOU WOULD BE BETTER OFF DEAD, OR OF HURTING YOURSELF: 0
SUM OF ALL RESPONSES TO PHQ QUESTIONS 1-9: 1
7. TROUBLE CONCENTRATING ON THINGS, SUCH AS READING THE NEWSPAPER OR WATCHING TELEVISION: 0

## 2024-01-16 NOTE — PROGRESS NOTES
"Chief Complaint  \"I want a vasectomy \"    Subjective          History of Present Illness  Geogre Valladares presents to [facility] for:    The patient has been pondering the option of a vasectomy for 3 months.   He presently has 3 children and is .   He voices no additional questions about birth control options.   No prior genital procedures.       Current Outpatient Medications:     amphetamine-dextroamphetamine (ADDERALL) 10 MG tablet, TAKE 1 TABLET BY MOUTH 2 TIMES DAILY FOR 30 DAYS.MAX. 20 MG/DAY, Disp: , Rfl:     FLUoxetine (PROzac) 40 MG capsule, , Disp: , Rfl:     omeprazole (priLOSEC) 20 MG capsule, Take 1 capsule by mouth Every Morning Before Breakfast., Disp: , Rfl:   Past Medical History:   Diagnosis Date    Depression      Past Surgical History:   Procedure Laterality Date    WISDOM TOOTH EXTRACTION           Review  of systems  Constitutional: Negative for chills and fever.   Gastrointestinal: Negative for abdominal pain, anal bleeding and blood in stool.   Genitourinary: Negative for flank pain and hematuria.      Objective   PHYSICAL EXAM  Vital Signs:   Ht 172.7 cm (68\")   Wt 85.7 kg (189 lb)   BMI 28.74 kg/m²     Constitutional: Well nourished, Well developed; No apparent distress  Psychiatric: Appropriate affect; Alert and oriented  Musculoskeletal: Normal gait and station  GI: Abdomen is soft, non-tender  Respiratory: No distress; Unlabored movement; No accessory musculature needed with symmetric movements  ; Penis and testicles are normal.  The vas deferens is palpable bilaterally and appears accessible for vasectomy.  Vitals reviewed.        DATA  Result Review :                      ASSESSMENT AND PLAN      Problem List Items Addressed This Visit    None  Visit Diagnoses       Encounter for vasectomy counseling    -  Primary    Relevant Orders    Vasectomy          The patient desires vasectomy.   There are 4 things I put significant emphasis on in this " visit.    -Vasectomy should be considered a permanent form of birth control.  I discussed how vasectomy accomplishes infertility.  I explained  the patient should not have this procedure thinking this would be a temporary birth control solution that could later be reversed by procedure.  I did explain vasectomy reversals can be done but they may well be ineffective at producing a pregnancy.  I explained the procedure  is often not covered by patient's insurance.  I also explained that this can get very expensive especially if it is combined with other infertility techniques to achieve a pregnancy.    -Vasectomy is not immediately effective.  I explained to the patient that sperm can be located in the ejaculatory ducts that are alive for several months.  In fact there are studies that have shown it may take some patients up to 6 months to clear sperm.  I explained the difference in life limiting sperm versus dead sperm.  I explained why we put such an emphasis on the requirement for patients to bring in his semen analysis at about 3 months to be sure we do not see sperm.  I did explain that if the specimen has rare dead sperm or no sperm, we consider that a successful vasectomy.     -Vasectomy is not a perfect form of birth control.  Somewhere between 1 in a  1000 (0.1%) to 1 in 2000 (0.2%) vasectomies will fail due to recanalization which can lead to resumption of fertility.  I explained this usually does not result in a symptom that would warrant a patient his fertility status has changed.  I did explain that some patients bring in a sample once a year or so but this does not reduce the risk of recanalization occurring.     -Vasectomy does have complications.  Initially we discussed bleeding with possible hematoma formation, infection, sperm granuloma, testicular atrophy from this potential complications.  I explained that while some patients will experience some postoperative discomfort this is not unusual.   However, up to 1% of patients will develop chronic testicular pain that can be prolonged as either a constant or intermittent discomfort.  It is explained that chronic pain requires a multidisciplinary approach rarely benefiting from anything procedural that a urologist could do to fix it.        FOLLOW UP     No follow-ups on file.        (Please note that portions of this note were completed with a voice recognition program.)  Jim Jensen MD  01/26/24  14:04 CST
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
## 2024-01-18 LAB — HBV CORE AB SERPL QL IA: NEGATIVE

## 2024-01-25 ENCOUNTER — OFFICE VISIT (OUTPATIENT)
Dept: UROLOGY | Facility: CLINIC | Age: 29
End: 2024-01-25
Payer: OTHER GOVERNMENT

## 2024-01-25 VITALS — WEIGHT: 189 LBS | HEIGHT: 68 IN | BODY MASS INDEX: 28.64 KG/M2

## 2024-01-25 DIAGNOSIS — Z30.09 ENCOUNTER FOR VASECTOMY COUNSELING: Primary | ICD-10-CM

## 2024-01-25 RX ORDER — ALPRAZOLAM 2 MG/1
2 TABLET ORAL ONCE
Qty: 1 TABLET | Refills: 0 | Status: SHIPPED | OUTPATIENT
Start: 2024-01-25 | End: 2024-01-25

## 2024-01-25 RX ORDER — OMEPRAZOLE 20 MG/1
20 CAPSULE, DELAYED RELEASE ORAL
COMMUNITY

## 2024-01-25 RX ORDER — DEXTROAMPHETAMINE SACCHARATE, AMPHETAMINE ASPARTATE, DEXTROAMPHETAMINE SULFATE AND AMPHETAMINE SULFATE 2.5; 2.5; 2.5; 2.5 MG/1; MG/1; MG/1; MG/1
TABLET ORAL
COMMUNITY
Start: 2024-01-02

## 2024-02-27 DIAGNOSIS — F90.2 ATTENTION DEFICIT HYPERACTIVITY DISORDER (ADHD), COMBINED TYPE: ICD-10-CM

## 2024-02-28 RX ORDER — DEXTROAMPHETAMINE SACCHARATE, AMPHETAMINE ASPARTATE, DEXTROAMPHETAMINE SULFATE AND AMPHETAMINE SULFATE 2.5; 2.5; 2.5; 2.5 MG/1; MG/1; MG/1; MG/1
10 TABLET ORAL 2 TIMES DAILY
Qty: 60 TABLET | Refills: 0 | Status: SHIPPED | OUTPATIENT
Start: 2024-02-28 | End: 2024-03-29

## 2024-04-03 DIAGNOSIS — F90.2 ATTENTION DEFICIT HYPERACTIVITY DISORDER (ADHD), COMBINED TYPE: ICD-10-CM

## 2024-04-04 NOTE — TELEPHONE ENCOUNTER
Delfino Calros called to request a refill on his medication.      Last office visit : 1/16/2024   Next office visit : 4/12/2024     Last UDS:   Amphetamine Screen, Urine   Date Value Ref Range Status   05/25/2023 neg  Final     Barbiturate Screen, Urine   Date Value Ref Range Status   05/25/2023 neg  Final     Benzodiazepine Screen, Urine   Date Value Ref Range Status   05/25/2023 neg  Final     Buprenorphine Urine   Date Value Ref Range Status   05/25/2023 neg  Final     Cocaine Metabolite Screen, Urine   Date Value Ref Range Status   05/25/2023 neg  Final     Gabapentin Screen, Urine   Date Value Ref Range Status   05/25/2023 neg  Final     MDMA, Urine   Date Value Ref Range Status   05/25/2023 neg  Final     Methamphetamine, Urine   Date Value Ref Range Status   05/25/2023 neg  Final     Opiate Scrn, Ur   Date Value Ref Range Status   05/25/2023 neg  Final     Oxycodone Screen, Ur   Date Value Ref Range Status   05/25/2023 neg  Final     PCP Screen, Urine   Date Value Ref Range Status   05/25/2023 neg  Final     Propoxyphene Screen, Urine   Date Value Ref Range Status   05/25/2023 neg  Final     THC Screen, Urine   Date Value Ref Range Status   05/25/2023 neg  Final     Tricyclic Antidepressants, Urine   Date Value Ref Range Status   05/25/2023 neg  Final       Last Jovi: 04-  Medication Contract: 10-  Last Fill: 02-    Requested Prescriptions     Pending Prescriptions Disp Refills    amphetamine-dextroamphetamine (ADDERALL, 10MG,) 10 MG tablet 60 tablet 0     Sig: Take 1 tablet by mouth 2 times daily for 30 days. Max Daily Amount: 20 mg         Please approve or refuse this medication.   Charla Gunter MA

## 2024-04-08 RX ORDER — DEXTROAMPHETAMINE SACCHARATE, AMPHETAMINE ASPARTATE, DEXTROAMPHETAMINE SULFATE AND AMPHETAMINE SULFATE 2.5; 2.5; 2.5; 2.5 MG/1; MG/1; MG/1; MG/1
10 TABLET ORAL 2 TIMES DAILY
Qty: 60 TABLET | Refills: 0 | Status: SHIPPED | OUTPATIENT
Start: 2024-04-08 | End: 2024-05-08

## 2024-04-27 DIAGNOSIS — F33.0 MDD (MAJOR DEPRESSIVE DISORDER), RECURRENT EPISODE, MILD (HCC): ICD-10-CM

## 2024-04-27 DIAGNOSIS — F41.1 GAD (GENERALIZED ANXIETY DISORDER): ICD-10-CM

## 2024-04-29 RX ORDER — FLUOXETINE HYDROCHLORIDE 40 MG/1
40 CAPSULE ORAL DAILY
Qty: 90 CAPSULE | Refills: 3 | Status: SHIPPED | OUTPATIENT
Start: 2024-04-29

## 2024-05-07 ENCOUNTER — OFFICE VISIT (OUTPATIENT)
Dept: PRIMARY CARE CLINIC | Age: 29
End: 2024-05-07

## 2024-05-07 VITALS
SYSTOLIC BLOOD PRESSURE: 120 MMHG | HEART RATE: 75 BPM | BODY MASS INDEX: 27.46 KG/M2 | OXYGEN SATURATION: 98 % | WEIGHT: 181.2 LBS | TEMPERATURE: 97.7 F | DIASTOLIC BLOOD PRESSURE: 80 MMHG

## 2024-05-07 DIAGNOSIS — J34.89 SINUS PRESSURE: ICD-10-CM

## 2024-05-07 DIAGNOSIS — B34.9 VIRAL ILLNESS: Primary | ICD-10-CM

## 2024-05-07 DIAGNOSIS — J02.9 SORE THROAT: ICD-10-CM

## 2024-05-07 PROCEDURE — 99213 OFFICE O/P EST LOW 20 MIN: CPT | Performed by: NURSE PRACTITIONER

## 2024-05-07 PROCEDURE — 87880 STREP A ASSAY W/OPTIC: CPT | Performed by: NURSE PRACTITIONER

## 2024-05-07 SDOH — ECONOMIC STABILITY: TRANSPORTATION INSECURITY
IN THE PAST 12 MONTHS, HAS LACK OF TRANSPORTATION KEPT YOU FROM MEETINGS, WORK, OR FROM GETTING THINGS NEEDED FOR DAILY LIVING?: NO

## 2024-05-07 SDOH — ECONOMIC STABILITY: FOOD INSECURITY: WITHIN THE PAST 12 MONTHS, YOU WORRIED THAT YOUR FOOD WOULD RUN OUT BEFORE YOU GOT MONEY TO BUY MORE.: SOMETIMES TRUE

## 2024-05-07 SDOH — ECONOMIC STABILITY: INCOME INSECURITY: HOW HARD IS IT FOR YOU TO PAY FOR THE VERY BASICS LIKE FOOD, HOUSING, MEDICAL CARE, AND HEATING?: SOMEWHAT HARD

## 2024-05-07 SDOH — ECONOMIC STABILITY: FOOD INSECURITY: WITHIN THE PAST 12 MONTHS, THE FOOD YOU BOUGHT JUST DIDN'T LAST AND YOU DIDN'T HAVE MONEY TO GET MORE.: SOMETIMES TRUE

## 2024-05-07 ASSESSMENT — ENCOUNTER SYMPTOMS
ALLERGIC/IMMUNOLOGIC NEGATIVE: 1
SORE THROAT: 1
ABDOMINAL PAIN: 1
EYES NEGATIVE: 1
NAUSEA: 1
RESPIRATORY NEGATIVE: 1

## 2024-05-07 NOTE — PROGRESS NOTES
[facility]  [address]  [address]  [place] [address]  Dept: [phone]  Dept Fax: [phone]  Loc: [phone]    Delfino Carlos is a 28 y.o. male who presents today for his medical conditions/complaints as noted below.  Delfino Carlos is c/o of Pharyngitis, Sweats, Abdominal Pain, Nausea, Fatigue, and Headache        HPI:   Patient of Dr. Madrid that for an acute care visit today.  He reports sore throat, abdominal pain, nausea, fatigue, headache, and sweats that started yesterday.  States he left work last night. He has been taking Tylenol for his symptom.  He denies any fever at home. He is needing a work excuse.   HPI   Chief Complaint   Patient presents with    Pharyngitis    Sweats    Abdominal Pain    Nausea    Fatigue    Headache     Past Medical History:   Diagnosis Date    Anxiety     Depression     Smokeless tobacco use 3/3/2020    Tobacco Use: Spent 5 minutes discussing smoking cessation, separate of total office visit time documented elsewhere.  Discussed health issues attributed to tobacco use.  Discussed medication options including nicotine replacement, Wellbutrin, and Chantix.  Discussed calling [id number] for further assistance.  Recommended picking a quit date.   Will discuss further at next appointment.      Past Surgical History:   Procedure Laterality Date    UPPER GASTROINTESTINAL ENDOSCOPY N/A 08/28/2023    Dr MELINA Salazar-, gastritis, no h pylori    WISDOM TOOTH EXTRACTION             5/7/2024     2:54 PM 1/16/2024     2:03 PM 11/13/2023     4:04 PM 10/30/2023     7:38 AM 10/22/2023     7:58 AM 10/10/2023     3:31 PM   Vitals   SYSTOLIC 120 118 129 124 117 122   DIASTOLIC 80 70 83 72 76 78   Pulse 75 71 75 84 88 83   Temp 97.7 °F (36.5 °C) 97.4 °F (36.3 °C) 97.2 °F (36.2 °C) 97.7 °F (36.5 °C) 98.8 °F (37.1 °C) 97.1 °F (36.2 °C)   Respirations   18  15    SpO2 98 % 99 % 95 % 96 % 98 % 96 %   Weight - Scale 181 lb 3.2 oz 196 lb 180

## 2024-05-21 DIAGNOSIS — F90.2 ATTENTION DEFICIT HYPERACTIVITY DISORDER (ADHD), COMBINED TYPE: ICD-10-CM

## 2024-05-21 RX ORDER — DEXTROAMPHETAMINE SACCHARATE, AMPHETAMINE ASPARTATE, DEXTROAMPHETAMINE SULFATE AND AMPHETAMINE SULFATE 2.5; 2.5; 2.5; 2.5 MG/1; MG/1; MG/1; MG/1
10 TABLET ORAL 2 TIMES DAILY
Qty: 60 TABLET | Refills: 0 | Status: SHIPPED | OUTPATIENT
Start: 2024-05-21 | End: 2024-06-20

## 2024-06-11 DIAGNOSIS — Z77.21 EXPOSURE TO BLOOD OR BODY FLUID: Primary | ICD-10-CM

## 2024-06-11 DIAGNOSIS — Z13.220 SCREENING FOR HYPERLIPIDEMIA: ICD-10-CM

## 2024-06-18 DIAGNOSIS — Z77.21 EXPOSURE TO BLOOD OR BODY FLUID: ICD-10-CM

## 2024-06-18 DIAGNOSIS — Z13.220 SCREENING FOR HYPERLIPIDEMIA: ICD-10-CM

## 2024-06-18 LAB
ALBUMIN SERPL-MCNC: 4.7 G/DL (ref 3.5–5.2)
ALP SERPL-CCNC: 74 U/L (ref 40–130)
ALT SERPL-CCNC: 14 U/L (ref 5–41)
ANION GAP SERPL CALCULATED.3IONS-SCNC: 7 MMOL/L (ref 7–19)
AST SERPL-CCNC: 16 U/L (ref 5–40)
BASOPHILS # BLD: 0.1 K/UL (ref 0–0.2)
BASOPHILS NFR BLD: 1.5 % (ref 0–1)
BILIRUB SERPL-MCNC: 0.4 MG/DL (ref 0.2–1.2)
BUN SERPL-MCNC: 14 MG/DL (ref 6–20)
CALCIUM SERPL-MCNC: 9.5 MG/DL (ref 8.6–10)
CHLORIDE SERPL-SCNC: 103 MMOL/L (ref 98–111)
CHOLEST SERPL-MCNC: 156 MG/DL (ref 160–199)
CO2 SERPL-SCNC: 31 MMOL/L (ref 22–29)
CREAT SERPL-MCNC: 1.1 MG/DL (ref 0.5–1.2)
EOSINOPHIL # BLD: 0.2 K/UL (ref 0–0.6)
EOSINOPHIL NFR BLD: 3.4 % (ref 0–5)
ERYTHROCYTE [DISTWIDTH] IN BLOOD BY AUTOMATED COUNT: 11.9 % (ref 11.5–14.5)
GLUCOSE SERPL-MCNC: 102 MG/DL (ref 74–109)
HCT VFR BLD AUTO: 45.9 % (ref 42–52)
HDLC SERPL-MCNC: 48 MG/DL (ref 55–121)
HGB BLD-MCNC: 15.4 G/DL (ref 14–18)
IMM GRANULOCYTES # BLD: 0 K/UL
LDLC SERPL CALC-MCNC: 90 MG/DL
LYMPHOCYTES # BLD: 2.6 K/UL (ref 1.1–4.5)
LYMPHOCYTES NFR BLD: 43.6 % (ref 20–40)
MCH RBC QN AUTO: 29.8 PG (ref 27–31)
MCHC RBC AUTO-ENTMCNC: 33.6 G/DL (ref 33–37)
MCV RBC AUTO: 88.8 FL (ref 80–94)
MONOCYTES # BLD: 0.4 K/UL (ref 0–0.9)
MONOCYTES NFR BLD: 6.7 % (ref 0–10)
NEUTROPHILS # BLD: 2.7 K/UL (ref 1.5–7.5)
NEUTS SEG NFR BLD: 44.6 % (ref 50–65)
PLATELET # BLD AUTO: 241 K/UL (ref 130–400)
PMV BLD AUTO: 11 FL (ref 9.4–12.4)
POTASSIUM SERPL-SCNC: 5.1 MMOL/L (ref 3.5–5)
PROT SERPL-MCNC: 7.3 G/DL (ref 6.6–8.7)
RBC # BLD AUTO: 5.17 M/UL (ref 4.7–6.1)
SODIUM SERPL-SCNC: 141 MMOL/L (ref 136–145)
TRIGL SERPL-MCNC: 91 MG/DL (ref 0–149)
WBC # BLD AUTO: 6 K/UL (ref 4.8–10.8)

## 2024-06-21 ENCOUNTER — OFFICE VISIT (OUTPATIENT)
Dept: PRIMARY CARE CLINIC | Age: 29
End: 2024-06-21

## 2024-06-21 VITALS
BODY MASS INDEX: 27.7 KG/M2 | TEMPERATURE: 97.1 F | HEIGHT: 69 IN | DIASTOLIC BLOOD PRESSURE: 76 MMHG | WEIGHT: 187 LBS | HEART RATE: 83 BPM | RESPIRATION RATE: 18 BRPM | OXYGEN SATURATION: 96 % | SYSTOLIC BLOOD PRESSURE: 116 MMHG

## 2024-06-21 DIAGNOSIS — M54.6 THORACIC SPINE PAIN: Primary | ICD-10-CM

## 2024-06-21 PROCEDURE — 99214 OFFICE O/P EST MOD 30 MIN: CPT | Performed by: FAMILY MEDICINE

## 2024-06-21 RX ORDER — TRAMADOL HYDROCHLORIDE 50 MG/1
50 TABLET ORAL EVERY 8 HOURS PRN
Qty: 15 TABLET | Refills: 0 | Status: SHIPPED | OUTPATIENT
Start: 2024-06-21 | End: 2024-06-26

## 2024-06-21 RX ORDER — PREDNISONE 20 MG/1
40 TABLET ORAL DAILY
Qty: 10 TABLET | Refills: 0 | Status: SHIPPED | OUTPATIENT
Start: 2024-06-21 | End: 2024-06-26

## 2024-06-21 RX ORDER — CYCLOBENZAPRINE HCL 5 MG
5 TABLET ORAL 2 TIMES DAILY PRN
Qty: 28 TABLET | Refills: 0 | Status: SHIPPED | OUTPATIENT
Start: 2024-06-21 | End: 2024-07-05

## 2024-06-21 RX ORDER — MELOXICAM 15 MG/1
15 TABLET ORAL DAILY
Qty: 30 TABLET | Refills: 3 | Status: SHIPPED | OUTPATIENT
Start: 2024-06-21

## 2024-06-21 NOTE — PROGRESS NOTES
Reason For Visit:   Back Pain.     Combined HPI and A/P:      Diagnosis Orders   1. Thoracic spine pain  meloxicam (MOBIC) 15 MG tablet    cyclobenzaprine (FLEXERIL) 5 MG tablet    traMADol (ULTRAM) 50 MG tablet    predniSONE (DELTASONE) 20 MG tablet          1.) Thoracic Spine Pain:       He has pain of the middle of his back. This started at noon. He twisted his back while running around a corner while at work. He has no pain down his legs. This radiates up and down. He is having muscle spasms. He has a throbbing pain in the middle of the back. He denies any numbness and tingling of his feet. I will prescribe Mobic, Flexeril, and Prednisone. I will prescribe a short course of Tramadol.          Return if symptoms worsen or fail to improve.    We discussed use, benefit, and side effects of prescribed medications.  All patient questions answered.   Patient agreed with treatment plan.   I reviewed available records in our system and care everywhere. In cases where records are not available, the records have been requested and will be reviewed when available.     Subjective      Past Surgical History:   Procedure Laterality Date    UPPER GASTROINTESTINAL ENDOSCOPY N/A 08/28/2023    Dr MELINA Salazar-, gastritis, no h pylori    WISDOM TOOTH EXTRACTION       Family History   Problem Relation Age of Onset    Depression Mother     Stomach Cancer Father     Depression Brother     High Blood Pressure Brother     Esophageal Cancer Brother     Depression Maternal Grandmother     Colon Cancer Neg Hx     Colon Polyps Neg Hx     Liver Cancer Neg Hx     Liver Disease Neg Hx     Rectal Cancer Neg Hx      Social History     Tobacco Use    Smoking status: Never    Smokeless tobacco: Current     Types: Snuff     Last attempt to quit: 7/31/2021   Substance Use Topics    Alcohol use: Yes     Comment: occasional      Current Outpatient Medications   Medication Sig Dispense Refill    meloxicam (MOBIC) 15 MG tablet Take 1 tablet by mouth

## 2024-06-28 ASSESSMENT — ENCOUNTER SYMPTOMS
BACK PAIN: 1
DIARRHEA: 0
COUGH: 0
NAUSEA: 0
SHORTNESS OF BREATH: 0
CONSTIPATION: 0
VOMITING: 0
TROUBLE SWALLOWING: 0
ABDOMINAL PAIN: 0

## 2024-07-11 DIAGNOSIS — F33.0 MDD (MAJOR DEPRESSIVE DISORDER), RECURRENT EPISODE, MILD (HCC): ICD-10-CM

## 2024-07-11 DIAGNOSIS — F90.2 ATTENTION DEFICIT HYPERACTIVITY DISORDER (ADHD), COMBINED TYPE: ICD-10-CM

## 2024-07-11 DIAGNOSIS — F41.1 GAD (GENERALIZED ANXIETY DISORDER): ICD-10-CM

## 2024-07-12 RX ORDER — FLUOXETINE HYDROCHLORIDE 40 MG/1
40 CAPSULE ORAL DAILY
Qty: 90 CAPSULE | Refills: 3 | Status: SHIPPED | OUTPATIENT
Start: 2024-07-12

## 2024-07-12 RX ORDER — DEXTROAMPHETAMINE SACCHARATE, AMPHETAMINE ASPARTATE, DEXTROAMPHETAMINE SULFATE AND AMPHETAMINE SULFATE 2.5; 2.5; 2.5; 2.5 MG/1; MG/1; MG/1; MG/1
10 TABLET ORAL 2 TIMES DAILY
Qty: 60 TABLET | Refills: 0 | Status: SHIPPED | OUTPATIENT
Start: 2024-07-12 | End: 2024-08-11

## 2024-07-16 ENCOUNTER — OFFICE VISIT (OUTPATIENT)
Dept: PRIMARY CARE CLINIC | Age: 29
End: 2024-07-16
Payer: OTHER GOVERNMENT

## 2024-07-16 VITALS
BODY MASS INDEX: 27.55 KG/M2 | WEIGHT: 186 LBS | HEART RATE: 87 BPM | SYSTOLIC BLOOD PRESSURE: 118 MMHG | DIASTOLIC BLOOD PRESSURE: 72 MMHG | HEIGHT: 69 IN | OXYGEN SATURATION: 98 %

## 2024-07-16 DIAGNOSIS — F33.0 MDD (MAJOR DEPRESSIVE DISORDER), RECURRENT EPISODE, MILD (HCC): ICD-10-CM

## 2024-07-16 DIAGNOSIS — R53.82 CHRONIC FATIGUE: Primary | ICD-10-CM

## 2024-07-16 DIAGNOSIS — F90.2 ATTENTION DEFICIT HYPERACTIVITY DISORDER (ADHD), COMBINED TYPE: ICD-10-CM

## 2024-07-16 DIAGNOSIS — R40.0 DAYTIME SOMNOLENCE: ICD-10-CM

## 2024-07-16 DIAGNOSIS — E55.9 VITAMIN D DEFICIENCY: ICD-10-CM

## 2024-07-16 DIAGNOSIS — F41.1 GAD (GENERALIZED ANXIETY DISORDER): ICD-10-CM

## 2024-07-16 PROBLEM — H52.223 REGULAR ASTIGMATISM OF BOTH EYES: Status: ACTIVE | Noted: 2024-07-16

## 2024-07-16 PROCEDURE — 99214 OFFICE O/P EST MOD 30 MIN: CPT | Performed by: FAMILY MEDICINE

## 2024-07-16 NOTE — PROGRESS NOTES
Physical Exam  Vitals and nursing note reviewed.   Constitutional:       General: He is not in acute distress.     Appearance: Normal appearance. He is overweight.   HENT:      Head: Normocephalic and atraumatic.   Cardiovascular:      Rate and Rhythm: Normal rate and regular rhythm.   Pulmonary:      Effort: Pulmonary effort is normal.      Breath sounds: Normal breath sounds. No wheezing.   Chest:      Chest wall: No tenderness.   Abdominal:      General: Abdomen is flat. Bowel sounds are normal.      Tenderness: There is no abdominal tenderness.   Skin:     General: Skin is warm and dry.   Neurological:      Mental Status: He is alert.   Psychiatric:         Mood and Affect: Mood normal.         Behavior: Behavior normal.         Thought Content: Thought content normal.           No results found for this visit on 07/16/24.          Arik Madrid MD

## 2024-07-23 PROBLEM — F43.29 ADJUSTMENT DISORDER WITH MIXED EMOTIONAL FEATURES: Status: RESOLVED | Noted: 2018-08-21 | Resolved: 2024-07-23

## 2024-07-23 ASSESSMENT — ENCOUNTER SYMPTOMS
VOMITING: 0
SHORTNESS OF BREATH: 0
COUGH: 0
CONSTIPATION: 0
NAUSEA: 0
BACK PAIN: 1
DIARRHEA: 0
TROUBLE SWALLOWING: 0
ABDOMINAL PAIN: 0

## 2024-08-23 ENCOUNTER — HOSPITAL ENCOUNTER (OUTPATIENT)
Dept: SLEEP CENTER | Age: 29
Discharge: HOME | End: 2024-08-25
Payer: OTHER GOVERNMENT

## 2024-08-23 DIAGNOSIS — R53.82 CHRONIC FATIGUE: ICD-10-CM

## 2024-08-23 DIAGNOSIS — R40.0 DAYTIME SOMNOLENCE: ICD-10-CM

## 2024-08-23 PROCEDURE — G0399 HOME SLEEP TEST/TYPE 3 PORTA: HCPCS

## 2024-08-23 NOTE — PROGRESS NOTES
Mr. Delfino Carlos presented today in the sleep center for a Home Sleep Test (HST).  Mr. Carlos was instructed on the device and was requested to wear the unit for two nights.  was asked to have the HST monitor back by 10AM on  08/26/2024. The patient acknowledged he understood. The HST device was tested and was in working order.

## 2024-08-24 PROCEDURE — G0399 HOME SLEEP TEST/TYPE 3 PORTA: HCPCS

## 2024-08-29 NOTE — PROGRESS NOTES
[facility]  [facility] [place] [address]  Phone [phone] Fax [phone]       Patient Name Delfino Carlos Account Number [id number]    1995 Referring Provider Arik Madrid MD   Age/ Gender 28 years/M Interpreting physician Gregorio Esparza MD,[facility]   Neck circumference Unavailable Device Alissa NightOne   Mallampati classification Unavailable Scoring Technician Bethany You [facility]   [place] score  Indications for the test excessive daytime somnolence   Height 69.0 inches Test Home Sleep Apnea Test   Weight 186.0 lbs Date of test 2024   BMI 27.5 Time in Bed (TIB) 256.1 minutes     Events   Index (#/hr) Total # Events Mean Duration (sec) Max Duration (sec) Events by Position        Supine Non-Supine        # Index # Index   Central Apneas 0.0 0 0.0 0.0 0 0.0 0 0.0   Obstructive Apneas 0.0 0 0.0 0.0 0 0.0 0 0.0   Mixed Apneas 0.0 0 0.0 0.0 0 0.0 0 0.0   Hypopneas 0.9 5 15.0 20.5 3 3.7 0 0.0   Estimated AHI  #events/TIB (hrs) 0.9 4 15.0 20.5 3.7 0.0   Time in Position (minutes) 48.1 0.3     Snoring  TST with snoring 23.6   % of TST with snoring 9.2     Oximetry distribution  <95 %  (minutes) 26.7   <90 %  (minutes) 0.0   <85 %  (minutes) 0.0   <80 %  (minutes) 0.0   <75 %  (minutes) 0.0   <70 %  (minutes) 0.0   <60 %  (minutes) 0.0   <50 %  (minutes) 0.0   Average (%) 95   Desat Index (#/hour) 2.6   Desat Max (%) 4   Desat Max dur (sec) 29.5   Lowest SpO2 (³ 2 sec) (%) 82     Heart Rate  Mean HR (BPM) 71.8   # of LHR 1   LHR min (BPM)  53    # of HHR 1   HHR max (BPM) 94         Interpretation:     Non diagnostic study due to failed data acquisition.    Recommendations:    Consider in lab sleep study if sleep apnea remains suspect.        Gregorio Yu MD, FCCP, [facility]

## 2024-08-29 NOTE — PROGRESS NOTES
[facility]  [facility] [place] [address]  Phone [phone] Fax [phone]       Patient Name Delfino Carlos Account Number [id number]    1995 Referring Provider Arik Madrid MD   Age/ Gender 28 years/M Interpreting physician Gregorio Esparza MD,[facility]   Neck circumference unavailable Device Alissa NightOne   Mallampati classification unavailable Scoring Technician Bethany You,[facility]   [place] score  Indications for the test excessive daytime somnolence   Height 69.0 inches Test Home Sleep Apnea Test   Weight 186.0 lbs Date of test 2024   BMI 27.5 Time in Bed (TIB) 563.3 minutes     Events   Index (#/hr) Total # Events Mean Duration (sec) Max Duration (sec) Events by Position        Supine Non-Supine        # Index # Index   Central Apneas 0.0 0 0.0 0.0 0 0.0 0 0.0   Obstructive Apneas 0.0 0 0.0 0.0 0 0.0 0 0.0   Mixed Apneas 0.0 0 0.0 0.0 0 0.0 0 0.0   Hypopneas 3.0 28 22.9 46.5 18 2.9 0 0.0   Estimated AHI  #events/TIB (hrs) 3.0 28 22.9 46.5 2.9 0.0   Time in Position (minutes) 371.6 0.5     Snoring  TST with snoring 149.5   % of TST with snoring 26.5     Oximetry distribution  <95 %  (minutes) 13.1   <90 %  (minutes) 0.0   <85 %  (minutes) 0.0   <80 %  (minutes) 0.0   <75 %  (minutes) 0.0   <70 %  (minutes) 0.0   <60 %  (minutes) 0.0   <50 %  (minutes) 0.0   Average (%) 96   Desat Index (#/hour) 3.0   Desat Max (%) 6   Desat Max dur (sec) 51.0   Lowest SpO2 (³ 2 sec) (%) 90     Heart Rate  Mean HR (BPM) 65.4   # of LHR 3   LHR min (BPM) 49   # of HHR 1   HHR max (BPM) 102       Interpretation:     No evidence of sleep related breathing disorder.   Subjective hypersomnia.     Recommendations:    Consider MSLT to further assess hypersomnia and rule out narcolepsy.   Consider in lab sleep study if sleep apnea remains suspect.   Avoid risky activity such as driving if sleepy.   Discuss sleep hygiene with the patient.        Gregorio

## 2024-09-06 ENCOUNTER — TELEPHONE (OUTPATIENT)
Dept: SLEEP CENTER | Age: 29
End: 2024-09-06

## 2024-09-06 NOTE — TELEPHONE ENCOUNTER
Spoke to Mr. Delfino Carlos. regarding his/her HST performed  08/23/2024 and 08/24/2024.  The HST revealed an AHI within normal limits.  Mr. Chicas was advised to follow-up with his/her ordering provider, Arik Madrid M.D,  and discuss the recommendations made by the interpreting physician.

## 2024-09-10 DIAGNOSIS — F90.2 ATTENTION DEFICIT HYPERACTIVITY DISORDER (ADHD), COMBINED TYPE: ICD-10-CM

## 2024-09-10 RX ORDER — DEXTROAMPHETAMINE SACCHARATE, AMPHETAMINE ASPARTATE, DEXTROAMPHETAMINE SULFATE AND AMPHETAMINE SULFATE 2.5; 2.5; 2.5; 2.5 MG/1; MG/1; MG/1; MG/1
10 TABLET ORAL 2 TIMES DAILY
Qty: 60 TABLET | Refills: 0 | Status: SHIPPED | OUTPATIENT
Start: 2024-09-10 | End: 2024-10-10

## 2024-10-02 DIAGNOSIS — F33.0 MDD (MAJOR DEPRESSIVE DISORDER), RECURRENT EPISODE, MILD (HCC): ICD-10-CM

## 2024-10-02 DIAGNOSIS — F41.1 GAD (GENERALIZED ANXIETY DISORDER): ICD-10-CM

## 2024-10-03 RX ORDER — FLUOXETINE 40 MG/1
40 CAPSULE ORAL DAILY
Qty: 90 CAPSULE | Refills: 3 | Status: SHIPPED | OUTPATIENT
Start: 2024-10-03

## 2024-10-24 ENCOUNTER — OFFICE VISIT (OUTPATIENT)
Dept: PRIMARY CARE CLINIC | Age: 29
End: 2024-10-24
Payer: OTHER GOVERNMENT

## 2024-10-24 VITALS
HEIGHT: 69 IN | OXYGEN SATURATION: 87 % | WEIGHT: 197 LBS | HEART RATE: 86 BPM | BODY MASS INDEX: 29.18 KG/M2 | SYSTOLIC BLOOD PRESSURE: 132 MMHG | DIASTOLIC BLOOD PRESSURE: 84 MMHG

## 2024-10-24 DIAGNOSIS — R09.89 CHEST CONGESTION: ICD-10-CM

## 2024-10-24 DIAGNOSIS — R05.1 ACUTE COUGH: ICD-10-CM

## 2024-10-24 DIAGNOSIS — J01.10 ACUTE NON-RECURRENT FRONTAL SINUSITIS: Primary | ICD-10-CM

## 2024-10-24 DIAGNOSIS — J02.9 SORE THROAT: ICD-10-CM

## 2024-10-24 LAB
INFLUENZA A ANTIGEN, POC: NEGATIVE
INFLUENZA B ANTIGEN, POC: NEGATIVE
LOT EXPIRE DATE: NORMAL
LOT KIT NUMBER: NORMAL
S PYO AG THROAT QL: NORMAL
SARS-COV-2, POC: NORMAL
VALID INTERNAL CONTROL: NORMAL
VENDOR AND KIT NAME POC: NORMAL

## 2024-10-24 PROCEDURE — 87880 STREP A ASSAY W/OPTIC: CPT | Performed by: FAMILY MEDICINE

## 2024-10-24 PROCEDURE — 99214 OFFICE O/P EST MOD 30 MIN: CPT | Performed by: FAMILY MEDICINE

## 2024-10-24 PROCEDURE — 87428 SARSCOV & INF VIR A&B AG IA: CPT | Performed by: FAMILY MEDICINE

## 2024-10-24 PROCEDURE — 96372 THER/PROPH/DIAG INJ SC/IM: CPT | Performed by: FAMILY MEDICINE

## 2024-10-24 RX ORDER — PREDNISONE 20 MG/1
20 TABLET ORAL 2 TIMES DAILY
Qty: 8 TABLET | Refills: 0 | Status: SHIPPED | OUTPATIENT
Start: 2024-10-24 | End: 2024-10-28

## 2024-10-24 RX ORDER — DEXAMETHASONE SODIUM PHOSPHATE 10 MG/ML
10 INJECTION INTRAMUSCULAR; INTRAVENOUS ONCE
Status: COMPLETED | OUTPATIENT
Start: 2024-10-24 | End: 2024-10-24

## 2024-10-24 RX ORDER — DEXAMETHASONE SODIUM PHOSPHATE 10 MG/ML
10 INJECTION INTRAMUSCULAR; INTRAVENOUS ONCE
Status: SHIPPED | OUTPATIENT
Start: 2024-10-24

## 2024-10-24 RX ORDER — AMOXICILLIN 875 MG/1
875 TABLET, COATED ORAL 2 TIMES DAILY
Qty: 20 TABLET | Refills: 0 | Status: SHIPPED | OUTPATIENT
Start: 2024-10-24 | End: 2024-11-03

## 2024-10-24 RX ADMIN — DEXAMETHASONE SODIUM PHOSPHATE 10 MG: 10 INJECTION INTRAMUSCULAR; INTRAVENOUS at 14:17

## 2024-10-24 NOTE — PROGRESS NOTES
Depression Brother     High Blood Pressure Brother     Esophageal Cancer Brother     Depression Maternal Grandmother     Colon Cancer Neg Hx     Colon Polyps Neg Hx     Liver Cancer Neg Hx     Liver Disease Neg Hx     Rectal Cancer Neg Hx      Social History     Tobacco Use    Smoking status: Never    Smokeless tobacco: Current     Types: Snuff     Last attempt to quit: 7/31/2021   Substance Use Topics    Alcohol use: Yes     Comment: occasional      Current Outpatient Medications   Medication Sig Dispense Refill    amoxicillin (AMOXIL) 875 MG tablet Take 1 tablet by mouth 2 times daily for 10 days 20 tablet 0    FLUoxetine (PROZAC) 40 MG capsule Take 1 capsule by mouth daily 90 capsule 3    omeprazole (PRILOSEC) 20 MG delayed release capsule Take 1 capsule by mouth every morning (before breakfast) 90 capsule 3    amphetamine-dextroamphetamine (ADDERALL, 10MG,) 10 MG tablet Take 1 tablet by mouth 2 times daily for 30 days. Max Daily Amount: 20 mg (Patient not taking: Reported on 10/24/2024) 60 tablet 0     Current Facility-Administered Medications   Medication Dose Route Frequency Provider Last Rate Last Admin    dexAMETHasone (DECADRON) injection 10 mg  10 mg IntraVENous Once          Allergies   Allergen Reactions    Azithromycin Headaches and Other (See Comments)     migraine         Review of Systems   Constitutional:  Positive for chills and fatigue. Negative for fever.   HENT:  Positive for congestion, postnasal drip, rhinorrhea, sinus pressure, sinus pain and sore throat. Negative for hearing loss and trouble swallowing.    Eyes:  Negative for visual disturbance.   Respiratory:  Negative for cough and shortness of breath.    Cardiovascular:  Negative for chest pain and palpitations.   Gastrointestinal:  Negative for abdominal pain, constipation, diarrhea, nausea and vomiting.   Skin:  Negative for rash and wound.   Neurological:  Positive for headaches. Negative for dizziness and syncope.

## 2025-04-03 DIAGNOSIS — R53.82 CHRONIC FATIGUE: ICD-10-CM

## 2025-04-03 DIAGNOSIS — E55.9 VITAMIN D DEFICIENCY: ICD-10-CM

## 2025-04-03 LAB
25(OH)D3 SERPL-MCNC: 17.3 NG/ML
ALBUMIN SERPL-MCNC: 4.6 G/DL (ref 3.5–5.2)
ALP SERPL-CCNC: 85 U/L (ref 40–129)
ALT SERPL-CCNC: 15 U/L (ref 10–50)
ANION GAP SERPL CALCULATED.3IONS-SCNC: 9 MMOL/L (ref 8–16)
AST SERPL-CCNC: 17 U/L (ref 10–50)
BASOPHILS # BLD: 0.1 K/UL (ref 0–0.2)
BASOPHILS NFR BLD: 1.2 % (ref 0–1)
BILIRUB SERPL-MCNC: 0.6 MG/DL (ref 0.2–1.2)
BUN SERPL-MCNC: 15 MG/DL (ref 6–20)
CALCIUM SERPL-MCNC: 9.5 MG/DL (ref 8.6–10)
CHLORIDE SERPL-SCNC: 105 MMOL/L (ref 98–107)
CO2 SERPL-SCNC: 26 MMOL/L (ref 22–29)
CREAT SERPL-MCNC: 1.2 MG/DL (ref 0.7–1.2)
EOSINOPHIL # BLD: 0.1 K/UL (ref 0–0.6)
EOSINOPHIL NFR BLD: 2.7 % (ref 0–5)
ERYTHROCYTE [DISTWIDTH] IN BLOOD BY AUTOMATED COUNT: 11.9 % (ref 11.5–14.5)
FOLATE SERPL-MCNC: 7.3 NG/ML (ref 4.5–32.2)
GLUCOSE SERPL-MCNC: 103 MG/DL (ref 70–99)
HCT VFR BLD AUTO: 45.5 % (ref 42–52)
HGB BLD-MCNC: 15.7 G/DL (ref 14–18)
IMM GRANULOCYTES # BLD: 0 K/UL
LYMPHOCYTES # BLD: 2.5 K/UL (ref 1.1–4.5)
LYMPHOCYTES NFR BLD: 49 % (ref 20–40)
MCH RBC QN AUTO: 29.7 PG (ref 27–31)
MCHC RBC AUTO-ENTMCNC: 34.5 G/DL (ref 33–37)
MCV RBC AUTO: 86 FL (ref 80–94)
MONOCYTES # BLD: 0.3 K/UL (ref 0–0.9)
MONOCYTES NFR BLD: 6.4 % (ref 0–10)
NEUTROPHILS # BLD: 2.1 K/UL (ref 1.5–7.5)
NEUTS SEG NFR BLD: 40.5 % (ref 50–65)
PLATELET # BLD AUTO: 255 K/UL (ref 130–400)
PMV BLD AUTO: 11.4 FL (ref 9.4–12.4)
POTASSIUM SERPL-SCNC: 4.4 MMOL/L (ref 3.5–5.1)
PROT SERPL-MCNC: 7.2 G/DL (ref 6.4–8.3)
RBC # BLD AUTO: 5.29 M/UL (ref 4.7–6.1)
SODIUM SERPL-SCNC: 140 MMOL/L (ref 136–145)
TSH SERPL DL<=0.005 MIU/L-ACNC: 2.34 UIU/ML (ref 0.27–4.2)
VIT B12 SERPL-MCNC: 648 PG/ML (ref 232–1245)
WBC # BLD AUTO: 5.2 K/UL (ref 4.8–10.8)

## 2025-04-07 LAB
SHBG SERPL-SCNC: 142.7 PG/ML (ref 47–244)
SHBG SERPL-SCNC: 16 NMOL/L (ref 17–56)
TESTOST SERPL-MCNC: 487 NG/DL (ref 249–836)

## 2025-04-10 ENCOUNTER — OFFICE VISIT (OUTPATIENT)
Dept: PRIMARY CARE CLINIC | Age: 30
End: 2025-04-10
Payer: OTHER GOVERNMENT

## 2025-04-10 VITALS
HEIGHT: 68 IN | BODY MASS INDEX: 30.37 KG/M2 | TEMPERATURE: 98 F | WEIGHT: 200.4 LBS | HEART RATE: 79 BPM | DIASTOLIC BLOOD PRESSURE: 80 MMHG | OXYGEN SATURATION: 98 % | SYSTOLIC BLOOD PRESSURE: 125 MMHG

## 2025-04-10 DIAGNOSIS — E55.9 VITAMIN D DEFICIENCY: ICD-10-CM

## 2025-04-10 DIAGNOSIS — R53.82 CHRONIC FATIGUE: Primary | ICD-10-CM

## 2025-04-10 PROCEDURE — 99214 OFFICE O/P EST MOD 30 MIN: CPT | Performed by: FAMILY MEDICINE

## 2025-04-10 RX ORDER — ERGOCALCIFEROL 1.25 MG/1
50000 CAPSULE, LIQUID FILLED ORAL WEEKLY
Qty: 12 CAPSULE | Refills: 2 | Status: SHIPPED | OUTPATIENT
Start: 2025-04-10

## 2025-04-10 SDOH — ECONOMIC STABILITY: FOOD INSECURITY: WITHIN THE PAST 12 MONTHS, THE FOOD YOU BOUGHT JUST DIDN'T LAST AND YOU DIDN'T HAVE MONEY TO GET MORE.: NEVER TRUE

## 2025-04-10 SDOH — ECONOMIC STABILITY: FOOD INSECURITY: WITHIN THE PAST 12 MONTHS, YOU WORRIED THAT YOUR FOOD WOULD RUN OUT BEFORE YOU GOT MONEY TO BUY MORE.: NEVER TRUE

## 2025-04-10 ASSESSMENT — PATIENT HEALTH QUESTIONNAIRE - PHQ9
3. TROUBLE FALLING OR STAYING ASLEEP: NEARLY EVERY DAY
5. POOR APPETITE OR OVEREATING: SEVERAL DAYS
SUM OF ALL RESPONSES TO PHQ QUESTIONS 1-9: 10
2. FEELING DOWN, DEPRESSED OR HOPELESS: NOT AT ALL
8. MOVING OR SPEAKING SO SLOWLY THAT OTHER PEOPLE COULD HAVE NOTICED. OR THE OPPOSITE, BEING SO FIGETY OR RESTLESS THAT YOU HAVE BEEN MOVING AROUND A LOT MORE THAN USUAL: NOT AT ALL
SUM OF ALL RESPONSES TO PHQ QUESTIONS 1-9: 10
9. THOUGHTS THAT YOU WOULD BE BETTER OFF DEAD, OR OF HURTING YOURSELF: NOT AT ALL
SUM OF ALL RESPONSES TO PHQ QUESTIONS 1-9: 10
10. IF YOU CHECKED OFF ANY PROBLEMS, HOW DIFFICULT HAVE THESE PROBLEMS MADE IT FOR YOU TO DO YOUR WORK, TAKE CARE OF THINGS AT HOME, OR GET ALONG WITH OTHER PEOPLE: NOT DIFFICULT AT ALL
SUM OF ALL RESPONSES TO PHQ QUESTIONS 1-9: 10
9. THOUGHTS THAT YOU WOULD BE BETTER OFF DEAD, OR OF HURTING YOURSELF: NOT AT ALL
6. FEELING BAD ABOUT YOURSELF - OR THAT YOU ARE A FAILURE OR HAVE LET YOURSELF OR YOUR FAMILY DOWN: SEVERAL DAYS
10. IF YOU CHECKED OFF ANY PROBLEMS, HOW DIFFICULT HAVE THESE PROBLEMS MADE IT FOR YOU TO DO YOUR WORK, TAKE CARE OF THINGS AT HOME, OR GET ALONG WITH OTHER PEOPLE: NOT DIFFICULT AT ALL
SUM OF ALL RESPONSES TO PHQ QUESTIONS 1-9: 10
1. LITTLE INTEREST OR PLEASURE IN DOING THINGS: SEVERAL DAYS
4. FEELING TIRED OR HAVING LITTLE ENERGY: NEARLY EVERY DAY
6. FEELING BAD ABOUT YOURSELF - OR THAT YOU ARE A FAILURE OR HAVE LET YOURSELF OR YOUR FAMILY DOWN: SEVERAL DAYS
2. FEELING DOWN, DEPRESSED OR HOPELESS: NOT AT ALL
7. TROUBLE CONCENTRATING ON THINGS, SUCH AS READING THE NEWSPAPER OR WATCHING TELEVISION: SEVERAL DAYS
1. LITTLE INTEREST OR PLEASURE IN DOING THINGS: SEVERAL DAYS
3. TROUBLE FALLING OR STAYING ASLEEP: NEARLY EVERY DAY
4. FEELING TIRED OR HAVING LITTLE ENERGY: NEARLY EVERY DAY
8. MOVING OR SPEAKING SO SLOWLY THAT OTHER PEOPLE COULD HAVE NOTICED. OR THE OPPOSITE - BEING SO FIDGETY OR RESTLESS THAT YOU HAVE BEEN MOVING AROUND A LOT MORE THAN USUAL: NOT AT ALL
7. TROUBLE CONCENTRATING ON THINGS, SUCH AS READING THE NEWSPAPER OR WATCHING TELEVISION: SEVERAL DAYS
5. POOR APPETITE OR OVEREATING: SEVERAL DAYS

## 2025-04-10 NOTE — PROGRESS NOTES
Reason For Visit:   Follow-up fatigue    Combined HPI and A/P:      Diagnosis Orders   1. Chronic fatigue        2. Vitamin D deficiency  vitamin D (ERGOCALCIFEROL) 1.25 MG (75836 UT) CAPS capsule          1.)  Chronic fatigue: This is a chronic, uncontrolled problem.  The patient has fatigue throughout the day.  He feels that he sleeps well overall.  He has had a sleep study this did not show sleep apnea.  At that time, but it was recommended that he continue to have fatigue to have an inpatient study to also rule out narcolepsy.  His testosterone was on the lower end of normal, but was not at a level that would indicate testosterone replacement.  We discussed over-the-counter supplements to boost free testosterone.  The patient will consider an inpatient sleep study in the future.  He is currently in the process of changing jobs, wants to wait so this is settled.    2.)  Vitamin D deficiency: This is a chronic, uncontrolled problem.  The patient's vitamin D was 17.3 on 4/3/2025.  The patient will be started on once weekly vitamin D supplements.         Return in about 6 months (around 10/10/2025).    We discussed use, benefit, and side effects of prescribed medications.  All patient questions answered.   Patient agreed with treatment plan.   I reviewed available records in our system and care everywhere. In cases where records are not available, the records have been requested and will be reviewed when available.     Subjective      Past Surgical History:   Procedure Laterality Date    UPPER GASTROINTESTINAL ENDOSCOPY N/A 08/28/2023    Dr MELINA Salazar-, gastritis, no h pylori    WISDOM TOOTH EXTRACTION       Family History   Problem Relation Age of Onset    Depression Mother     Stomach Cancer Father     Depression Brother     High Blood Pressure Brother     Esophageal Cancer Brother     Depression Maternal Grandmother     Colon Cancer Neg Hx     Colon Polyps Neg Hx     Liver Cancer Neg Hx     Liver Disease Neg Hx

## 2025-04-17 ASSESSMENT — ENCOUNTER SYMPTOMS
NAUSEA: 0
BACK PAIN: 1
CONSTIPATION: 0
COUGH: 0
DIARRHEA: 0
ABDOMINAL PAIN: 0
TROUBLE SWALLOWING: 0
SHORTNESS OF BREATH: 0
VOMITING: 0

## (undated) DEVICE — BRUSH ENDOSCP 2 END CHN HEDGEHOG

## (undated) DEVICE — SPONGE ENDOSCP CLN BS INF PREVENTION KOALA

## (undated) DEVICE — SINGLE PORT MANIFOLD: Brand: NEPTUNE 2

## (undated) DEVICE — FORCEPS BX 240CM 2.4MM L NDL RAD JAW 4 M00513334

## (undated) DEVICE — CANNULA NSL AD L7FT DIV O2 CO2 W/ M LUERLOCK TRMPT CONN

## (undated) DEVICE — BITE BLOCK ENDOSCP AD 60 FR W/ ADJ STRP PLAS GRN BLOX

## (undated) DEVICE — COLON KIT WITH 1.1 OZ ORCA HYDRA SEAL 2 GOWN

## (undated) DEVICE — ADAPTER CLEANING PORPOISE CLEANING